# Patient Record
Sex: MALE | Race: WHITE | Employment: OTHER | URBAN - NONMETROPOLITAN AREA
[De-identification: names, ages, dates, MRNs, and addresses within clinical notes are randomized per-mention and may not be internally consistent; named-entity substitution may affect disease eponyms.]

---

## 2017-06-20 ENCOUNTER — HOSPITAL ENCOUNTER (EMERGENCY)
Facility: HOSPITAL | Age: 82
Discharge: HOME | End: 2017-06-20
Attending: PHYSICIAN ASSISTANT | Admitting: PHYSICIAN ASSISTANT
Payer: MEDICARE

## 2017-06-20 VITALS
OXYGEN SATURATION: 97 % | DIASTOLIC BLOOD PRESSURE: 101 MMHG | SYSTOLIC BLOOD PRESSURE: 183 MMHG | TEMPERATURE: 97.5 F | RESPIRATION RATE: 20 BRPM

## 2017-06-20 DIAGNOSIS — I10 ESSENTIAL HYPERTENSION: ICD-10-CM

## 2017-06-20 DIAGNOSIS — R42 DIZZINESS: ICD-10-CM

## 2017-06-20 LAB
ALBUMIN SERPL-MCNC: 3.7 G/DL (ref 3.4–5)
ALP SERPL-CCNC: 68 U/L (ref 40–150)
ALT SERPL W P-5'-P-CCNC: 28 U/L (ref 0–70)
ANION GAP SERPL CALCULATED.3IONS-SCNC: 7 MMOL/L (ref 3–14)
AST SERPL W P-5'-P-CCNC: 24 U/L (ref 0–45)
BASOPHILS # BLD AUTO: 0.1 10E9/L (ref 0–0.2)
BASOPHILS NFR BLD AUTO: 1.1 %
BILIRUB SERPL-MCNC: 0.8 MG/DL (ref 0.2–1.3)
BUN SERPL-MCNC: 22 MG/DL (ref 7–30)
CALCIUM SERPL-MCNC: 9.1 MG/DL (ref 8.5–10.1)
CHLORIDE SERPL-SCNC: 102 MMOL/L (ref 94–109)
CO2 SERPL-SCNC: 28 MMOL/L (ref 20–32)
CREAT SERPL-MCNC: 1.21 MG/DL (ref 0.66–1.25)
DIFFERENTIAL METHOD BLD: NORMAL
EOSINOPHIL # BLD AUTO: 0.2 10E9/L (ref 0–0.7)
EOSINOPHIL NFR BLD AUTO: 3.2 %
ERYTHROCYTE [DISTWIDTH] IN BLOOD BY AUTOMATED COUNT: 13.9 % (ref 10–15)
GFR SERPL CREATININE-BSD FRML MDRD: 56 ML/MIN/1.7M2
GLUCOSE SERPL-MCNC: 123 MG/DL (ref 70–99)
HCT VFR BLD AUTO: 41 % (ref 40–53)
HGB BLD-MCNC: 13.9 G/DL (ref 13.3–17.7)
IMM GRANULOCYTES # BLD: 0 10E9/L (ref 0–0.4)
IMM GRANULOCYTES NFR BLD: 0.2 %
LYMPHOCYTES # BLD AUTO: 1.2 10E9/L (ref 0.8–5.3)
LYMPHOCYTES NFR BLD AUTO: 21.5 %
MCH RBC QN AUTO: 30.8 PG (ref 26.5–33)
MCHC RBC AUTO-ENTMCNC: 33.9 G/DL (ref 31.5–36.5)
MCV RBC AUTO: 91 FL (ref 78–100)
MONOCYTES # BLD AUTO: 0.7 10E9/L (ref 0–1.3)
MONOCYTES NFR BLD AUTO: 12.2 %
NEUTROPHILS # BLD AUTO: 3.4 10E9/L (ref 1.6–8.3)
NEUTROPHILS NFR BLD AUTO: 61.8 %
NRBC # BLD AUTO: 0 10*3/UL
NRBC BLD AUTO-RTO: 0 /100
PLATELET # BLD AUTO: 180 10E9/L (ref 150–450)
POTASSIUM SERPL-SCNC: 4.4 MMOL/L (ref 3.4–5.3)
PROT SERPL-MCNC: 7.4 G/DL (ref 6.8–8.8)
RBC # BLD AUTO: 4.51 10E12/L (ref 4.4–5.9)
SODIUM SERPL-SCNC: 137 MMOL/L (ref 133–144)
WBC # BLD AUTO: 5.6 10E9/L (ref 4–11)

## 2017-06-20 PROCEDURE — 80053 COMPREHEN METABOLIC PANEL: CPT | Performed by: PHYSICIAN ASSISTANT

## 2017-06-20 PROCEDURE — 71010 ZZHC CHEST ONE VIEW: CPT | Mod: TC

## 2017-06-20 PROCEDURE — 93005 ELECTROCARDIOGRAM TRACING: CPT

## 2017-06-20 PROCEDURE — 93010 ELECTROCARDIOGRAM REPORT: CPT | Performed by: INTERNAL MEDICINE

## 2017-06-20 PROCEDURE — 70450 CT HEAD/BRAIN W/O DYE: CPT | Mod: TC

## 2017-06-20 PROCEDURE — 99285 EMERGENCY DEPT VISIT HI MDM: CPT | Performed by: PHYSICIAN ASSISTANT

## 2017-06-20 PROCEDURE — 36415 COLL VENOUS BLD VENIPUNCTURE: CPT | Performed by: PHYSICIAN ASSISTANT

## 2017-06-20 PROCEDURE — 99285 EMERGENCY DEPT VISIT HI MDM: CPT | Mod: 25

## 2017-06-20 PROCEDURE — 85025 COMPLETE CBC W/AUTO DIFF WBC: CPT | Performed by: PHYSICIAN ASSISTANT

## 2017-06-20 RX ORDER — TAMSULOSIN HYDROCHLORIDE 0.4 MG/1
0.4 CAPSULE ORAL DAILY
COMMUNITY
End: 2018-01-01

## 2017-06-20 ASSESSMENT — ENCOUNTER SYMPTOMS
WEAKNESS: 0
PSYCHIATRIC NEGATIVE: 1
SORE THROAT: 0
NECK PAIN: 0
NECK STIFFNESS: 0
FEVER: 0
TREMORS: 0
PHOTOPHOBIA: 0
LIGHT-HEADEDNESS: 0
SEIZURES: 0
DIZZINESS: 1
FACIAL ASYMMETRY: 0
SPEECH DIFFICULTY: 0
SHORTNESS OF BREATH: 0
HEADACHES: 0
NUMBNESS: 0
EYES NEGATIVE: 1
PALPITATIONS: 0
CHILLS: 0
CONSTITUTIONAL NEGATIVE: 1

## 2017-06-20 NOTE — ED AVS SNAPSHOT
[facility]    [address] [address]    Phone:  [phone]                                       Danny Muniz   MRN: [id number]    Department:  [facility]   Date of Visit:  6/20/2017           Patient Information     Date Of Birth          1/27/1928        Your diagnoses for this visit were:     Dizziness     Essential hypertension        You were seen by Paulina Glaser PA-C.      Follow-up Information     Follow up with Erick Estevez MD In 1 day.    Specialty:  Family Practice    Contact information:    [facility]  [address] [facility] [id number] [phone]          Follow up with [facility].    Specialty:  EMERGENCY MEDICINE    Why:  If symptoms worsen    Contact information:    [address] [address] [phone]    Additional information:    From [facility]: Take [address]. Turn left at [address]. Turn left at the first stoplight on [address]. At the first stop sign, take a right onto [address]. Take a left into the parking lot and continue through until you reach the North enterance of the building.       From [place]: Take [address]. Take the [address] ramp towards [place]. Turn left onto [address]. Take a slight right onto [address]. Turn left at the first stoplight on [address]. At the first stop sign, take a right onto [address]. Take a left into the parking lot and continue through until you reach the North enterance of the building.       From [place]: Take [address]. Take a right at [address]. At the first stop sign, take a right onto [address]. Take a left into the parking lot and continue through until you reach the North enterance of the building.         Discharge Instructions       Follow up with Dr. Estevez regarding your high blood pressure ASAP as you may need an additional medication  prescribed. Please return here sooner with any new or worsening symptoms.         Dizziness (Uncertain Cause)  Dizziness is a common symptom. It may be described as lightheadedness, spinning, or feeling like you are going to faint. Dizziness can have many causes.  Be sure to tell the healthcare provider about:    All medicines you take, including prescription, over-the-counter, herbs, and supplements    Any other symptoms you have    Any health problems you are being treated for    Anything that causes the dizziness to get worse or better  Today's exam did not show an exact cause for your dizziness. Other tests may be needed. Follow up with your healthcare provider.  Home care    Dizziness that occurs with sudden standing may be a sign of mild dehydration. Drink extra fluids for the next few days.    If you recently started a new medicine, stopped a medicine, or had the dose of a current medicine changed, talk with the prescribing healthcare provider. Your medicine plan may need adjustment.    If dizziness lasts more than a few seconds, sit or lie down until it passes. This may help prevent injury in case you pass out.    Do not drive or use power tools or dangerous equipment until you have had no dizziness for at least 48 hours.  Follow-up care  Follow up with your healthcare provider for further evaluation within the next 7 days or as advised.  When to seek medical advice  Call your healthcare provider for any of the following:    Worsening of symptoms or new symptoms    Passing out or seizure    Repeated vomiting    Headache    Palpitations (the sense that your heart is fluttering or beating fast or hard)    Shortness of breath    Blood in vomit or stool (black or red color)    Weakness of an arm or leg or one side of the face    Vision or hearing changes    Trouble walking or speaking    Chest, arm, neck, back, or jaw pain  Date Last Reviewed: 8/23/2015 2000-2017 [employer]. [address] [place] [address]  "Road, [place] [address]. All rights reserved. This information is not intended as a substitute for professional medical care. Always follow your healthcare professional's instructions.             Review of your medicines      Our records show that you are taking the medicines listed below. If these are incorrect, please call your family doctor or clinic.        Dose / Directions Last dose taken    ALLOPURINOL PO   Dose:  50 mg        Take 50 mg by mouth daily   Refills:  0        FLOMAX 0.4 MG capsule   Dose:  0.4 mg   Generic drug:  tamsulosin        Take 0.4 mg by mouth daily   Refills:  0        VITAMIN B 12 PO        Take by mouth daily   Refills:  0                Procedures and tests performed during your visit     CBC with platelets differential    CT Head w/o Contrast    Cardiac Continuous Monitoring    Chest  XR, 1 view portable    Comprehensive metabolic panel    EKG 12 lead    Orthostatic blood pressure and pulse    Peripheral IV catheter      Orders Needing Specimen Collection     None      Pending Results     Date and Time Order Name Status Description    6/20/2017 1544 Chest  XR, 1 view portable Preliminary             Pending Culture Results     No orders found from 6/18/2017 to 6/21/2017.            Thank you for choosing [facility]       Thank you for choosing [facility] for your care. Our goal is always to provide you with excellent care. Hearing back from our patients is one way we can continue to improve our services. Please take a few minutes to complete the written survey that you may receive in the mail after you visit with us. Thank you!        [employer] Information     [employer] lets you send messages to your doctor, view your test results, renew your prescriptions, schedule appointments and more. To sign up, go to [email or web address] . Click on \"Log in\" on the left side of the screen, which will take you to the Welcome page. Then click on \"Sign up Now\" on the right side of the page.     You " will be asked to enter the access code listed below, as well as some personal information. Please follow the directions to create your username and password.     Your access code is: [email or web address]  Expires: 2017  5:14 PM     Your access code will  in 90 days. If you need help or a new code, please call your [place] clinic or [phone].        Care EveryWhere ID     This is your Care EveryWhere ID. This could be used by other organizations to access your [facility] medical records  [id number]        After Visit Summary       This is your record. Keep this with you and show to your community pharmacist(s) and doctor(s) at your next visit.

## 2017-06-20 NOTE — ED AVS SNAPSHOT
[facility]    [address]    [place] [address]    Phone:  [phone]                                       Danny Muniz   MRN: [id number]    Department:  [facility]   Date of Visit:  6/20/2017           After Visit Summary Signature Page     I have received my discharge instructions, and my questions have been answered. I have discussed any challenges I see with this plan with the nurse or doctor.    ..........................................................................................................................................  Patient/Patient Representative Signature      ..........................................................................................................................................  Patient Representative Print Name and Relationship to Patient    ..................................................               ................................................  Date                                            Time    ..........................................................................................................................................  Reviewed by Signature/Title    ...................................................              ..............................................  Date                                                            Time

## 2017-06-20 NOTE — ED NOTES
Patient discharged home at this time. Patient given written and verbal discharge instructions regarding home care, f/u and medications. Patient verbalized understanding of all discharge instructions.

## 2017-06-20 NOTE — ED NOTES
89 y/o male presents with c/o sudden onset dizziness. States he was at mass at 1430 when he suddenly felt dizzy, worse with position changes. Denies chest pain, SOB, headache or n/v. Negative FAST

## 2017-06-20 NOTE — DISCHARGE INSTRUCTIONS
Follow up with Dr. Estevez regarding your high blood pressure ASAP as you may need an additional medication prescribed. Please return here sooner with any new or worsening symptoms.         Dizziness (Uncertain Cause)  Dizziness is a common symptom. It may be described as lightheadedness, spinning, or feeling like you are going to faint. Dizziness can have many causes.  Be sure to tell the healthcare provider about:    All medicines you take, including prescription, over-the-counter, herbs, and supplements    Any other symptoms you have    Any health problems you are being treated for    Anything that causes the dizziness to get worse or better  Today's exam did not show an exact cause for your dizziness. Other tests may be needed. Follow up with your healthcare provider.  Home care    Dizziness that occurs with sudden standing may be a sign of mild dehydration. Drink extra fluids for the next few days.    If you recently started a new medicine, stopped a medicine, or had the dose of a current medicine changed, talk with the prescribing healthcare provider. Your medicine plan may need adjustment.    If dizziness lasts more than a few seconds, sit or lie down until it passes. This may help prevent injury in case you pass out.    Do not drive or use power tools or dangerous equipment until you have had no dizziness for at least 48 hours.  Follow-up care  Follow up with your healthcare provider for further evaluation within the next 7 days or as advised.  When to seek medical advice  Call your healthcare provider for any of the following:    Worsening of symptoms or new symptoms    Passing out or seizure    Repeated vomiting    Headache    Palpitations (the sense that your heart is fluttering or beating fast or hard)    Shortness of breath    Blood in vomit or stool (black or red color)    Weakness of an arm or leg or one side of the face    Vision or hearing changes    Trouble walking or speaking    Chest, arm, neck,  back, or jaw pain  Date Last Reviewed: 8/23/2015 2000-2017 [employer]. [address] [place] [address], [place] [address]. All rights reserved. This information is not intended as a substitute for professional medical care. Always follow your healthcare professional's instructions.

## 2017-06-21 NOTE — ED PROVIDER NOTES
History     Chief Complaint   Patient presents with     Dizziness     worse with position changes     HPI  Danny Muniz is a 89 year old male who presents with dizziness while at [employer] this afternoon aroudn 1430. Dizziness was worse with position changes. Dizziness has resolved currently. He has h/o this and states it is usually due to mild dehydration. He tried drinking a few glasses of water at [employer] but the dizziness persisted, so he came here for evaluation. Denies HA, numbness, tingling, or weakness. Denies recent head injuries.     I have reviewed the Medications, Allergies, Past Medical and Surgical History, and Social History in the [employer] system.    Past Medical History: No past medical history on file.    No past surgical history on file.    Social History     Social History     Marital status: Single     Spouse name: N/A     Number of children: N/A     Years of education: N/A     Occupational History     Not on file.     Social History Main Topics     Smoking status: Not on file     Smokeless tobacco: Not on file     Alcohol use Not on file     Drug use: Not on file     Sexual activity: Not on file     Other Topics Concern     Not on file     Social History Narrative       Discharge Medication List as of 6/20/2017  5:18 PM      CONTINUE these medications which have NOT CHANGED    Details   ALLOPURINOL PO Take 50 mg by mouth daily, Historical      Cyanocobalamin (VITAMIN B 12 PO) Take by mouth daily, Historical      tamsulosin (FLOMAX) 0.4 MG capsule Take 0.4 mg by mouth daily, Historical             Allergies: Review of patient's allergies indicates no known allergies.    Review of Systems   Constitutional: Negative.  Negative for chills and fever.   HENT: Negative for sore throat.    Eyes: Negative.  Negative for photophobia and visual disturbance.   Respiratory: Negative for shortness of breath.    Cardiovascular: Negative for chest pain and palpitations.   Musculoskeletal: Negative for neck pain  and neck stiffness.   Skin: Negative for rash.   Neurological: Positive for dizziness. Negative for tremors, seizures, syncope, facial asymmetry, speech difficulty, weakness, light-headedness, numbness and headaches.   Psychiatric/Behavioral: Negative.    All other systems reviewed and are negative.      Physical Exam   BP: (!) 170/125  Heart Rate: 72  Temp: 97.1  F (36.2  C)  Resp: 20  SpO2: 96 %  Physical Exam   Constitutional: He is oriented to person, place, and time. He appears well-developed and well-nourished.  Non-toxic appearance. He does not have a sickly appearance. He does not appear ill. No distress.   HENT:   Head: Normocephalic and atraumatic.   Right Ear: Hearing, tympanic membrane, external ear and ear canal normal.   Left Ear: Hearing, tympanic membrane, external ear and ear canal normal.   Nose: Nose normal. Right sinus exhibits no maxillary sinus tenderness and no frontal sinus tenderness. Left sinus exhibits no maxillary sinus tenderness and no frontal sinus tenderness.   Mouth/Throat: Uvula is midline, oropharynx is clear and moist and mucous membranes are normal. No oropharyngeal exudate.   Eyes: Conjunctivae and EOM are normal. Pupils are equal, round, and reactive to light. Right eye exhibits no discharge. Left eye exhibits no discharge. No scleral icterus.   Fundoscopic exam:       The right eye shows no papilledema.        The left eye shows no papilledema.   Neck: Trachea normal, normal range of motion and full passive range of motion without pain. Neck supple. No JVD present. No Brudzinski's sign and no Kernig's sign noted.   Cardiovascular: Normal rate, regular rhythm, normal heart sounds and intact distal pulses.  Exam reveals no gallop and no friction rub.    No murmur heard.  Pulmonary/Chest: Effort normal and breath sounds normal. No respiratory distress. He has no wheezes. He has no rales.   Abdominal: Soft. Bowel sounds are normal.   Musculoskeletal: Normal range of motion. He  exhibits no edema.   Lymphadenopathy:     He has no cervical adenopathy.   Neurological: He is alert and oriented to person, place, and time. He has normal strength and normal reflexes. He displays no atrophy and no tremor. No cranial nerve deficit or sensory deficit. He exhibits normal muscle tone. He displays a negative Romberg sign. He displays no seizure activity. Coordination and gait normal. GCS eye subscore is 4. GCS verbal subscore is 5. GCS motor subscore is 6.   Skin: Skin is warm and dry. No rash noted. He is not diaphoretic.   Psychiatric: He has a normal mood and affect. His behavior is normal. Judgment and thought content normal.   Nursing note and vitals reviewed.      ED Course     ED Course     Procedures             Labs Ordered and Resulted from Time of ED Arrival Up to the Time of Departure from the ED   COMPREHENSIVE METABOLIC PANEL - Abnormal; Notable for the following:        Result Value    Glucose 123 (*)     GFR Estimate 56 (*)     All other components within normal limits   CBC WITH PLATELETS DIFFERENTIAL   PERIPHERAL IV CATHETER   ORTHOSTATIC BLOOD PRESSURE AND PULSE   CARDIAC CONTINUOUS MONITORING       Assessments & Plan (with Medical Decision Making)   Pt presents with dizziness worse with position changes. Orthostatics are negative. BP is actually elevated while here 160's-180's systolic/90's-100. Head CT negative for acute findings. Labs unremarkable. He does not appear dehydrated. He is on Flomax for BPH so perhaps this may have caused his symptoms earlier today, but he certainly isn't hypotensive while here. I will have him f/u with his PCP Dr. Estevez to address the hypertension and if additional medications are needed. Pt happy with this plan and he was discharged home in good condition. Walked around department w/o dizziness, feeling back to baseline.     Plan: Follow up with Dr. Estevez regarding your high blood pressure ASAP as you may need an additional medication  prescribed. Please return here sooner with any new or worsening symptoms.     I have reviewed the nursing notes.    I have reviewed the findings, diagnosis, plan and need for follow up with the patient.    Discharge Medication List as of 6/20/2017  5:18 PM          Final diagnoses:   Dizziness   Essential hypertension       6/20/2017   [facility]     Paulina Glaser PA-C  06/20/17 [id number]

## 2018-01-01 ENCOUNTER — APPOINTMENT (OUTPATIENT)
Dept: CT IMAGING | Facility: HOSPITAL | Age: 83
End: 2018-01-01
Attending: PHYSICIAN ASSISTANT
Payer: MEDICARE

## 2018-01-01 ENCOUNTER — HOSPITAL ENCOUNTER (INPATIENT)
Facility: HOSPITAL | Age: 83
LOS: 2 days | DRG: 682 | End: 2018-12-20
Attending: INTERNAL MEDICINE | Admitting: HOSPITALIST
Payer: MEDICARE

## 2018-01-01 ENCOUNTER — APPOINTMENT (OUTPATIENT)
Dept: SPEECH THERAPY | Facility: HOSPITAL | Age: 83
DRG: 682 | End: 2018-01-01
Attending: INTERNAL MEDICINE
Payer: MEDICARE

## 2018-01-01 ENCOUNTER — HOSPITAL ENCOUNTER (EMERGENCY)
Facility: HOSPITAL | Age: 83
Discharge: HOME | End: 2018-09-25
Attending: FAMILY MEDICINE | Admitting: FAMILY MEDICINE
Payer: MEDICARE

## 2018-01-01 ENCOUNTER — HOSPITAL ENCOUNTER (EMERGENCY)
Facility: HOSPITAL | Age: 83
Discharge: HOME | End: 2018-07-25
Attending: PHYSICIAN ASSISTANT | Admitting: PHYSICIAN ASSISTANT
Payer: MEDICARE

## 2018-01-01 ENCOUNTER — TRANSFERRED RECORDS (OUTPATIENT)
Dept: HEALTH INFORMATION MANAGEMENT | Facility: CLINIC | Age: 83
End: 2018-01-01

## 2018-01-01 ENCOUNTER — HOSPITAL ENCOUNTER (OUTPATIENT)
Dept: MRI IMAGING | Facility: HOSPITAL | Age: 83
Discharge: HOME | End: 2018-06-01
Attending: FAMILY MEDICINE | Admitting: FAMILY MEDICINE
Payer: MEDICARE

## 2018-01-01 ENCOUNTER — APPOINTMENT (OUTPATIENT)
Dept: CT IMAGING | Facility: HOSPITAL | Age: 83
End: 2018-01-01
Attending: FAMILY MEDICINE
Payer: MEDICARE

## 2018-01-01 ENCOUNTER — APPOINTMENT (OUTPATIENT)
Dept: GENERAL RADIOLOGY | Facility: HOSPITAL | Age: 83
End: 2018-01-01
Attending: EMERGENCY MEDICINE
Payer: MEDICARE

## 2018-01-01 ENCOUNTER — APPOINTMENT (OUTPATIENT)
Dept: GENERAL RADIOLOGY | Facility: HOSPITAL | Age: 83
DRG: 682 | End: 2018-01-01
Attending: INTERNAL MEDICINE
Payer: MEDICARE

## 2018-01-01 ENCOUNTER — APPOINTMENT (OUTPATIENT)
Dept: GENERAL RADIOLOGY | Facility: HOSPITAL | Age: 83
End: 2018-01-01
Attending: INTERNAL MEDICINE
Payer: MEDICARE

## 2018-01-01 ENCOUNTER — HOSPITAL ENCOUNTER (OUTPATIENT)
Dept: MRI IMAGING | Facility: HOSPITAL | Age: 83
End: 2018-06-12
Attending: FAMILY MEDICINE
Payer: MEDICARE

## 2018-01-01 ENCOUNTER — HOSPITAL ENCOUNTER (EMERGENCY)
Facility: HOSPITAL | Age: 83
Discharge: HOME | End: 2018-06-13
Attending: INTERNAL MEDICINE | Admitting: INTERNAL MEDICINE
Payer: MEDICARE

## 2018-01-01 ENCOUNTER — HOSPITAL ENCOUNTER (EMERGENCY)
Facility: HOSPITAL | Age: 83
Discharge: HOME | End: 2018-09-15
Attending: FAMILY MEDICINE | Admitting: FAMILY MEDICINE
Payer: MEDICARE

## 2018-01-01 ENCOUNTER — APPOINTMENT (OUTPATIENT)
Dept: GENERAL RADIOLOGY | Facility: HOSPITAL | Age: 83
End: 2018-01-01
Attending: FAMILY MEDICINE
Payer: MEDICARE

## 2018-01-01 ENCOUNTER — HOSPITAL ENCOUNTER (OUTPATIENT)
Dept: CARDIOLOGY | Facility: HOSPITAL | Age: 83
End: 2018-06-12
Attending: FAMILY MEDICINE
Payer: MEDICARE

## 2018-01-01 ENCOUNTER — HOSPITAL ENCOUNTER (EMERGENCY)
Facility: HOSPITAL | Age: 83
Discharge: HOME | End: 2018-09-18
Attending: EMERGENCY MEDICINE | Admitting: EMERGENCY MEDICINE
Payer: MEDICARE

## 2018-01-01 ENCOUNTER — APPOINTMENT (OUTPATIENT)
Dept: CT IMAGING | Facility: HOSPITAL | Age: 83
End: 2018-01-01
Attending: INTERNAL MEDICINE
Payer: MEDICARE

## 2018-01-01 ENCOUNTER — HOSPITAL ENCOUNTER (EMERGENCY)
Facility: HOSPITAL | Age: 83
Discharge: HOME | End: 2018-10-30
Attending: FAMILY MEDICINE | Admitting: FAMILY MEDICINE
Payer: MEDICARE

## 2018-01-01 ENCOUNTER — HOSPITAL ENCOUNTER (OUTPATIENT)
Dept: MRI IMAGING | Facility: HOSPITAL | Age: 83
Discharge: HOME | End: 2018-06-12
Attending: FAMILY MEDICINE | Admitting: FAMILY MEDICINE
Payer: MEDICARE

## 2018-01-01 ENCOUNTER — TELEPHONE (OUTPATIENT)
Dept: CASE MANAGEMENT | Facility: HOSPITAL | Age: 83
End: 2018-01-01

## 2018-01-01 VITALS
DIASTOLIC BLOOD PRESSURE: 81 MMHG | SYSTOLIC BLOOD PRESSURE: 144 MMHG | TEMPERATURE: 96.9 F | OXYGEN SATURATION: 95 % | RESPIRATION RATE: 17 BRPM | HEART RATE: 68 BPM

## 2018-01-01 VITALS
RESPIRATION RATE: 16 BRPM | DIASTOLIC BLOOD PRESSURE: 72 MMHG | SYSTOLIC BLOOD PRESSURE: 161 MMHG | TEMPERATURE: 98.1 F | HEART RATE: 83 BPM | OXYGEN SATURATION: 98 %

## 2018-01-01 VITALS
HEART RATE: 85 BPM | BODY MASS INDEX: 19.95 KG/M2 | OXYGEN SATURATION: 90 % | DIASTOLIC BLOOD PRESSURE: 68 MMHG | RESPIRATION RATE: 26 BRPM | WEIGHT: 116.84 LBS | TEMPERATURE: 98.4 F | SYSTOLIC BLOOD PRESSURE: 109 MMHG | HEIGHT: 64 IN

## 2018-01-01 VITALS
DIASTOLIC BLOOD PRESSURE: 101 MMHG | HEART RATE: 88 BPM | OXYGEN SATURATION: 97 % | TEMPERATURE: 98.1 F | RESPIRATION RATE: 18 BRPM | SYSTOLIC BLOOD PRESSURE: 178 MMHG

## 2018-01-01 VITALS
TEMPERATURE: 97.8 F | RESPIRATION RATE: 18 BRPM | OXYGEN SATURATION: 98 % | DIASTOLIC BLOOD PRESSURE: 79 MMHG | SYSTOLIC BLOOD PRESSURE: 174 MMHG

## 2018-01-01 VITALS
OXYGEN SATURATION: 98 % | DIASTOLIC BLOOD PRESSURE: 78 MMHG | HEART RATE: 60 BPM | SYSTOLIC BLOOD PRESSURE: 155 MMHG | TEMPERATURE: 97.2 F | RESPIRATION RATE: 17 BRPM

## 2018-01-01 VITALS
OXYGEN SATURATION: 98 % | SYSTOLIC BLOOD PRESSURE: 120 MMHG | TEMPERATURE: 97.6 F | RESPIRATION RATE: 19 BRPM | DIASTOLIC BLOOD PRESSURE: 71 MMHG

## 2018-01-01 DIAGNOSIS — R29.6 FALLS FREQUENTLY: ICD-10-CM

## 2018-01-01 DIAGNOSIS — I63.9 CEREBELLAR STROKE (H): ICD-10-CM

## 2018-01-01 DIAGNOSIS — N39.0 UTI (URINARY TRACT INFECTION) WITH PYURIA: ICD-10-CM

## 2018-01-01 DIAGNOSIS — R39.81 FUNCTIONAL URINARY INCONTINENCE: ICD-10-CM

## 2018-01-01 DIAGNOSIS — N39.43 POST-VOID DRIBBLING: ICD-10-CM

## 2018-01-01 DIAGNOSIS — N18.9 CHRONIC KIDNEY DISEASE, UNSPECIFIED CKD STAGE: ICD-10-CM

## 2018-01-01 DIAGNOSIS — N32.89 SPASTIC BLADDER: ICD-10-CM

## 2018-01-01 DIAGNOSIS — R29.898 WEAKNESS OF BOTH LOWER EXTREMITIES: ICD-10-CM

## 2018-01-01 DIAGNOSIS — I10 HYPERTENSION, UNCONTROLLED: ICD-10-CM

## 2018-01-01 DIAGNOSIS — B02.9 HERPES ZOSTER WITHOUT COMPLICATION: ICD-10-CM

## 2018-01-01 LAB
ALBUMIN SERPL-MCNC: 2.6 G/DL (ref 3.4–5)
ALBUMIN SERPL-MCNC: 3.4 G/DL (ref 3.4–5)
ALBUMIN SERPL-MCNC: 3.4 G/DL (ref 3.4–5)
ALBUMIN SERPL-MCNC: 3.5 G/DL (ref 3.4–5)
ALBUMIN SERPL-MCNC: 3.6 G/DL (ref 3.4–5)
ALBUMIN SERPL-MCNC: 4 G/DL (ref 3.4–5)
ALBUMIN UR-MCNC: 10 MG/DL
ALBUMIN UR-MCNC: 100 MG/DL
ALBUMIN UR-MCNC: 30 MG/DL
ALBUMIN UR-MCNC: 30 MG/DL
ALBUMIN UR-MCNC: NEGATIVE MG/DL
ALP SERPL-CCNC: 64 U/L (ref 40–150)
ALP SERPL-CCNC: 81 U/L (ref 40–150)
ALP SERPL-CCNC: 84 U/L (ref 40–150)
ALP SERPL-CCNC: 86 U/L (ref 40–150)
ALP SERPL-CCNC: 98 U/L (ref 40–150)
ALP SERPL-CCNC: 98 U/L (ref 40–150)
ALT SERPL W P-5'-P-CCNC: 18 U/L (ref 0–70)
ALT SERPL W P-5'-P-CCNC: 20 U/L (ref 0–70)
ALT SERPL W P-5'-P-CCNC: 22 U/L (ref 0–70)
ALT SERPL W P-5'-P-CCNC: 251 U/L (ref 0–70)
ALT SERPL W P-5'-P-CCNC: 49 U/L (ref 0–70)
ALT SERPL W P-5'-P-CCNC: 53 U/L (ref 0–70)
ANION GAP SERPL CALCULATED.3IONS-SCNC: 11 MMOL/L (ref 3–14)
ANION GAP SERPL CALCULATED.3IONS-SCNC: 13 MMOL/L (ref 3–14)
ANION GAP SERPL CALCULATED.3IONS-SCNC: 14 MMOL/L (ref 3–14)
ANION GAP SERPL CALCULATED.3IONS-SCNC: 6 MMOL/L (ref 3–14)
ANION GAP SERPL CALCULATED.3IONS-SCNC: 6 MMOL/L (ref 3–14)
ANION GAP SERPL CALCULATED.3IONS-SCNC: 7 MMOL/L (ref 3–14)
ANION GAP SERPL CALCULATED.3IONS-SCNC: 8 MMOL/L (ref 3–14)
ANION GAP SERPL CALCULATED.3IONS-SCNC: 8 MMOL/L (ref 3–14)
APPEARANCE UR: ABNORMAL
APPEARANCE UR: CLEAR
AST SERPL W P-5'-P-CCNC: 154 U/L (ref 0–45)
AST SERPL W P-5'-P-CCNC: 17 U/L (ref 0–45)
AST SERPL W P-5'-P-CCNC: 18 U/L (ref 0–45)
AST SERPL W P-5'-P-CCNC: 23 U/L (ref 0–45)
AST SERPL W P-5'-P-CCNC: 429 U/L (ref 0–45)
AST SERPL W P-5'-P-CCNC: 55 U/L (ref 0–45)
BACTERIA #/AREA URNS HPF: ABNORMAL /HPF
BACTERIA SPEC CULT: ABNORMAL
BACTERIA SPEC CULT: NORMAL
BASOPHILS # BLD AUTO: 0 10E9/L (ref 0–0.2)
BASOPHILS NFR BLD AUTO: 0.1 %
BASOPHILS NFR BLD AUTO: 0.3 %
BASOPHILS NFR BLD AUTO: 0.5 %
BASOPHILS NFR BLD AUTO: 0.5 %
BASOPHILS NFR BLD AUTO: 0.6 %
BASOPHILS NFR BLD AUTO: 0.6 %
BASOPHILS NFR BLD AUTO: 0.7 %
BILIRUB SERPL-MCNC: 0.7 MG/DL (ref 0.2–1.3)
BILIRUB SERPL-MCNC: 0.7 MG/DL (ref 0.2–1.3)
BILIRUB SERPL-MCNC: 0.8 MG/DL (ref 0.2–1.3)
BILIRUB SERPL-MCNC: 1.1 MG/DL (ref 0.2–1.3)
BILIRUB SERPL-MCNC: 1.3 MG/DL (ref 0.2–1.3)
BILIRUB SERPL-MCNC: 1.6 MG/DL (ref 0.2–1.3)
BILIRUB UR QL STRIP: NEGATIVE
BUN SERPL-MCNC: 125 MG/DL (ref 7–30)
BUN SERPL-MCNC: 128 MG/DL (ref 7–30)
BUN SERPL-MCNC: 21 MG/DL (ref 7–30)
BUN SERPL-MCNC: 28 MG/DL (ref 7–30)
BUN SERPL-MCNC: 30 MG/DL (ref 7–30)
BUN SERPL-MCNC: 32 MG/DL (ref 7–30)
BUN SERPL-MCNC: 39 MG/DL (ref 7–30)
BUN SERPL-MCNC: 47 MG/DL (ref 7–30)
CALCIUM SERPL-MCNC: 8.2 MG/DL (ref 8.5–10.1)
CALCIUM SERPL-MCNC: 8.3 MG/DL (ref 8.5–10.1)
CALCIUM SERPL-MCNC: 8.4 MG/DL (ref 8.5–10.1)
CALCIUM SERPL-MCNC: 8.6 MG/DL (ref 8.5–10.1)
CALCIUM SERPL-MCNC: 8.7 MG/DL (ref 8.5–10.1)
CALCIUM SERPL-MCNC: 9.2 MG/DL (ref 8.5–10.1)
CALCIUM SERPL-MCNC: 9.3 MG/DL (ref 8.5–10.1)
CALCIUM SERPL-MCNC: 9.3 MG/DL (ref 8.5–10.1)
CHLORIDE SERPL-SCNC: 101 MMOL/L (ref 94–109)
CHLORIDE SERPL-SCNC: 103 MMOL/L (ref 94–109)
CHLORIDE SERPL-SCNC: 103 MMOL/L (ref 94–109)
CHLORIDE SERPL-SCNC: 104 MMOL/L (ref 94–109)
CHLORIDE SERPL-SCNC: 116 MMOL/L (ref 94–109)
CHLORIDE SERPL-SCNC: 119 MMOL/L (ref 94–109)
CHLORIDE SERPL-SCNC: 94 MMOL/L (ref 94–109)
CHLORIDE SERPL-SCNC: 94 MMOL/L (ref 94–109)
CK SERPL-CCNC: 96 U/L (ref 30–300)
CO2 SERPL-SCNC: 18 MMOL/L (ref 20–32)
CO2 SERPL-SCNC: 26 MMOL/L (ref 20–32)
CO2 SERPL-SCNC: 27 MMOL/L (ref 20–32)
CO2 SERPL-SCNC: 28 MMOL/L (ref 20–32)
CO2 SERPL-SCNC: 28 MMOL/L (ref 20–32)
CO2 SERPL-SCNC: 29 MMOL/L (ref 20–32)
COLOR UR AUTO: ABNORMAL
COLOR UR AUTO: YELLOW
CREAT SERPL-MCNC: 0.91 MG/DL (ref 0.66–1.25)
CREAT SERPL-MCNC: 1 MG/DL (ref 0.66–1.25)
CREAT SERPL-MCNC: 1.06 MG/DL (ref 0.66–1.25)
CREAT SERPL-MCNC: 1.06 MG/DL (ref 0.66–1.25)
CREAT SERPL-MCNC: 1.2 MG/DL (ref 0.66–1.25)
CREAT SERPL-MCNC: 1.41 MG/DL (ref 0.7–1.2)
CREAT SERPL-MCNC: 1.61 MG/DL (ref 0.66–1.25)
CREAT SERPL-MCNC: 1.99 MG/DL (ref 0.66–1.25)
CREAT SERPL-MCNC: 2.11 MG/DL (ref 0.66–1.25)
CREAT SERPL-MCNC: 2.19 MG/DL (ref 0.66–1.25)
CRP SERPL-MCNC: 11.3 MG/L (ref 0–8)
CRP SERPL-MCNC: 6.5 MG/L (ref 0–8)
DIFFERENTIAL METHOD BLD: ABNORMAL
EOSINOPHIL # BLD AUTO: 0 10E9/L (ref 0–0.7)
EOSINOPHIL # BLD AUTO: 0.1 10E9/L (ref 0–0.7)
EOSINOPHIL # BLD AUTO: 0.1 10E9/L (ref 0–0.7)
EOSINOPHIL # BLD AUTO: 0.2 10E9/L (ref 0–0.7)
EOSINOPHIL NFR BLD AUTO: 0.1 %
EOSINOPHIL NFR BLD AUTO: 0.2 %
EOSINOPHIL NFR BLD AUTO: 1.9 %
EOSINOPHIL NFR BLD AUTO: 1.9 %
EOSINOPHIL NFR BLD AUTO: 2.8 %
ERYTHROCYTE [DISTWIDTH] IN BLOOD BY AUTOMATED COUNT: 13.2 % (ref 10–15)
ERYTHROCYTE [DISTWIDTH] IN BLOOD BY AUTOMATED COUNT: 13.3 % (ref 10–15)
ERYTHROCYTE [DISTWIDTH] IN BLOOD BY AUTOMATED COUNT: 13.4 % (ref 10–15)
ERYTHROCYTE [DISTWIDTH] IN BLOOD BY AUTOMATED COUNT: 13.5 % (ref 10–15)
ERYTHROCYTE [DISTWIDTH] IN BLOOD BY AUTOMATED COUNT: 13.6 % (ref 10–15)
ERYTHROCYTE [DISTWIDTH] IN BLOOD BY AUTOMATED COUNT: 13.7 % (ref 10–15)
ERYTHROCYTE [DISTWIDTH] IN BLOOD BY AUTOMATED COUNT: 15 % (ref 10–15)
ERYTHROCYTE [DISTWIDTH] IN BLOOD BY AUTOMATED COUNT: 15.1 % (ref 10–15)
ERYTHROCYTE [SEDIMENTATION RATE] IN BLOOD BY WESTERGREN METHOD: 17 MM/H (ref 0–20)
GFR SERPL CREATININE-BSD FRML MDRD: 25 ML/MIN/{1.73_M2}
GFR SERPL CREATININE-BSD FRML MDRD: 27 ML/MIN/{1.73_M2}
GFR SERPL CREATININE-BSD FRML MDRD: 32 ML/MIN/1.7M2
GFR SERPL CREATININE-BSD FRML MDRD: 40 ML/MIN/1.7M2
GFR SERPL CREATININE-BSD FRML MDRD: 57 ML/MIN/1.7M2
GFR SERPL CREATININE-BSD FRML MDRD: 66 ML/MIN/1.7M2
GFR SERPL CREATININE-BSD FRML MDRD: 66 ML/MIN/1.7M2
GFR SERPL CREATININE-BSD FRML MDRD: 70 ML/MIN/1.7M2
GFR SERPL CREATININE-BSD FRML MDRD: 78 ML/MIN/1.7M2
GLUCOSE SERPL-MCNC: 105 MG/DL (ref 70–99)
GLUCOSE SERPL-MCNC: 105 MG/DL (ref 70–99)
GLUCOSE SERPL-MCNC: 119 MG/DL (ref 70–99)
GLUCOSE SERPL-MCNC: 122 MG/DL (ref 70–99)
GLUCOSE SERPL-MCNC: 126 MG/DL (ref 70–99)
GLUCOSE SERPL-MCNC: 162 MG/DL (ref 70–99)
GLUCOSE SERPL-MCNC: 86 MG/DL (ref 70–100)
GLUCOSE SERPL-MCNC: 87 MG/DL (ref 70–99)
GLUCOSE SERPL-MCNC: 91 MG/DL (ref 70–99)
GLUCOSE UR STRIP-MCNC: NEGATIVE MG/DL
HCT VFR BLD AUTO: 32.4 % (ref 40–53)
HCT VFR BLD AUTO: 32.6 % (ref 40–53)
HCT VFR BLD AUTO: 36.6 % (ref 40–53)
HCT VFR BLD AUTO: 37.8 % (ref 40–53)
HCT VFR BLD AUTO: 38.7 % (ref 40–53)
HCT VFR BLD AUTO: 39.9 % (ref 40–53)
HCT VFR BLD AUTO: 40.1 % (ref 40–53)
HCT VFR BLD AUTO: 45.1 % (ref 40–53)
HGB BLD-MCNC: 11.2 G/DL (ref 13.3–17.7)
HGB BLD-MCNC: 11.3 G/DL (ref 13.3–17.7)
HGB BLD-MCNC: 13 G/DL (ref 13.3–17.7)
HGB BLD-MCNC: 13 G/DL (ref 13.3–17.7)
HGB BLD-MCNC: 13.2 G/DL (ref 13.3–17.7)
HGB BLD-MCNC: 13.3 G/DL (ref 13.3–17.7)
HGB BLD-MCNC: 13.9 G/DL (ref 13.3–17.7)
HGB BLD-MCNC: 15.1 G/DL (ref 13.3–17.7)
HGB UR QL STRIP: ABNORMAL
HGB UR QL STRIP: NEGATIVE
HGB UR QL STRIP: NEGATIVE
IMM GRANULOCYTES # BLD: 0 10E9/L (ref 0–0.4)
IMM GRANULOCYTES NFR BLD: 0.2 %
IMM GRANULOCYTES NFR BLD: 0.2 %
IMM GRANULOCYTES NFR BLD: 0.3 %
IMM GRANULOCYTES NFR BLD: 0.4 %
IMM GRANULOCYTES NFR BLD: 0.4 %
INR PPP: 1.08 (ref 0.8–1.2)
KETONES UR STRIP-MCNC: 10 MG/DL
KETONES UR STRIP-MCNC: NEGATIVE MG/DL
LACTATE BLD-SCNC: 1.2 MMOL/L (ref 0.7–2)
LACTATE BLD-SCNC: 2.7 MMOL/L (ref 0.7–2)
LEUKOCYTE ESTERASE UR QL STRIP: ABNORMAL
LEUKOCYTE ESTERASE UR QL STRIP: NEGATIVE
LYMPHOCYTES # BLD AUTO: 0.5 10E9/L (ref 0.8–5.3)
LYMPHOCYTES # BLD AUTO: 0.5 10E9/L (ref 0.8–5.3)
LYMPHOCYTES # BLD AUTO: 0.7 10E9/L (ref 0.8–5.3)
LYMPHOCYTES # BLD AUTO: 0.7 10E9/L (ref 0.8–5.3)
LYMPHOCYTES # BLD AUTO: 0.9 10E9/L (ref 0.8–5.3)
LYMPHOCYTES # BLD AUTO: 1 10E9/L (ref 0.8–5.3)
LYMPHOCYTES # BLD AUTO: 1.4 10E9/L (ref 0.8–5.3)
LYMPHOCYTES NFR BLD AUTO: 10.3 %
LYMPHOCYTES NFR BLD AUTO: 12.6 %
LYMPHOCYTES NFR BLD AUTO: 17.6 %
LYMPHOCYTES NFR BLD AUTO: 26.2 %
LYMPHOCYTES NFR BLD AUTO: 4.6 %
LYMPHOCYTES NFR BLD AUTO: 6.3 %
LYMPHOCYTES NFR BLD AUTO: 8.7 %
Lab: NORMAL
Lab: NORMAL
MAGNESIUM SERPL-MCNC: 2.1 MG/DL (ref 1.6–2.3)
MCH RBC QN AUTO: 30.5 PG (ref 26.5–33)
MCH RBC QN AUTO: 31 PG (ref 26.5–33)
MCH RBC QN AUTO: 31.2 PG (ref 26.5–33)
MCH RBC QN AUTO: 31.2 PG (ref 26.5–33)
MCH RBC QN AUTO: 31.3 PG (ref 26.5–33)
MCH RBC QN AUTO: 31.4 PG (ref 26.5–33)
MCH RBC QN AUTO: 31.4 PG (ref 26.5–33)
MCH RBC QN AUTO: 31.5 PG (ref 26.5–33)
MCHC RBC AUTO-ENTMCNC: 32.9 G/DL (ref 31.5–36.5)
MCHC RBC AUTO-ENTMCNC: 33.5 G/DL (ref 31.5–36.5)
MCHC RBC AUTO-ENTMCNC: 34.4 G/DL (ref 31.5–36.5)
MCHC RBC AUTO-ENTMCNC: 34.4 G/DL (ref 31.5–36.5)
MCHC RBC AUTO-ENTMCNC: 34.6 G/DL (ref 31.5–36.5)
MCHC RBC AUTO-ENTMCNC: 34.7 G/DL (ref 31.5–36.5)
MCHC RBC AUTO-ENTMCNC: 34.8 G/DL (ref 31.5–36.5)
MCHC RBC AUTO-ENTMCNC: 35.5 G/DL (ref 31.5–36.5)
MCV RBC AUTO: 88 FL (ref 78–100)
MCV RBC AUTO: 89 FL (ref 78–100)
MCV RBC AUTO: 90 FL (ref 78–100)
MCV RBC AUTO: 90 FL (ref 78–100)
MCV RBC AUTO: 91 FL (ref 78–100)
MCV RBC AUTO: 91 FL (ref 78–100)
MCV RBC AUTO: 94 FL (ref 78–100)
MCV RBC AUTO: 95 FL (ref 78–100)
MONOCYTES # BLD AUTO: 0.5 10E9/L (ref 0–1.3)
MONOCYTES # BLD AUTO: 0.7 10E9/L (ref 0–1.3)
MONOCYTES # BLD AUTO: 0.7 10E9/L (ref 0–1.3)
MONOCYTES # BLD AUTO: 0.8 10E9/L (ref 0–1.3)
MONOCYTES # BLD AUTO: 1 10E9/L (ref 0–1.3)
MONOCYTES NFR BLD AUTO: 12.3 %
MONOCYTES NFR BLD AUTO: 12.8 %
MONOCYTES NFR BLD AUTO: 14.9 %
MONOCYTES NFR BLD AUTO: 17.9 %
MONOCYTES NFR BLD AUTO: 6 %
MONOCYTES NFR BLD AUTO: 7.7 %
MONOCYTES NFR BLD AUTO: 9.1 %
MUCOUS THREADS #/AREA URNS LPF: PRESENT /LPF
NEUTROPHILS # BLD AUTO: 14 10E9/L (ref 1.6–8.3)
NEUTROPHILS # BLD AUTO: 3.1 10E9/L (ref 1.6–8.3)
NEUTROPHILS # BLD AUTO: 3.3 10E9/L (ref 1.6–8.3)
NEUTROPHILS # BLD AUTO: 3.5 10E9/L (ref 1.6–8.3)
NEUTROPHILS # BLD AUTO: 4.9 10E9/L (ref 1.6–8.3)
NEUTROPHILS # BLD AUTO: 5 10E9/L (ref 1.6–8.3)
NEUTROPHILS # BLD AUTO: 9.5 10E9/L (ref 1.6–8.3)
NEUTROPHILS NFR BLD AUTO: 57.3 %
NEUTROPHILS NFR BLD AUTO: 63.6 %
NEUTROPHILS NFR BLD AUTO: 71.9 %
NEUTROPHILS NFR BLD AUTO: 72.2 %
NEUTROPHILS NFR BLD AUTO: 82.6 %
NEUTROPHILS NFR BLD AUTO: 83.8 %
NEUTROPHILS NFR BLD AUTO: 88.9 %
NITRATE UR QL: NEGATIVE
NITRATE UR QL: POSITIVE
NRBC # BLD AUTO: 0 10*3/UL
NRBC BLD AUTO-RTO: 0 /100
NT-PROBNP SERPL-MCNC: 1270 PG/ML (ref 0–1800)
PH UR STRIP: 5.5 PH (ref 4.7–8)
PH UR STRIP: 5.5 PH (ref 4.7–8)
PH UR STRIP: 6 PH (ref 4.7–8)
PH UR STRIP: 6.5 PH (ref 4.7–8)
PH UR STRIP: 8 PH (ref 4.7–8)
PLATELET # BLD AUTO: 160 10E9/L (ref 150–450)
PLATELET # BLD AUTO: 164 10E9/L (ref 150–450)
PLATELET # BLD AUTO: 167 10E9/L (ref 150–450)
PLATELET # BLD AUTO: 169 10E9/L (ref 150–450)
PLATELET # BLD AUTO: 178 10E9/L (ref 150–450)
PLATELET # BLD AUTO: 208 10E9/L (ref 150–450)
PLATELET # BLD AUTO: 239 10E9/L (ref 150–450)
PLATELET # BLD AUTO: 270 10E9/L (ref 150–450)
PLATELET # BLD AUTO: 306 10E9/L (ref 150–450)
POTASSIUM SERPL-SCNC: 3.5 MMOL/L (ref 3.4–5.3)
POTASSIUM SERPL-SCNC: 3.7 MMOL/L (ref 3.4–5.3)
POTASSIUM SERPL-SCNC: 3.8 MMOL/L (ref 3.4–5.3)
POTASSIUM SERPL-SCNC: 4.1 MMOL/L (ref 3.4–5.3)
POTASSIUM SERPL-SCNC: 4.2 MMOL/L (ref 3.4–5.3)
POTASSIUM SERPL-SCNC: 4.3 MEQ/L (ref 3.4–5.1)
POTASSIUM SERPL-SCNC: 4.3 MMOL/L (ref 3.4–5.3)
POTASSIUM SERPL-SCNC: 4.4 MMOL/L (ref 3.4–5.3)
POTASSIUM SERPL-SCNC: 4.6 MMOL/L (ref 3.4–5.3)
PROT SERPL-MCNC: 6.6 G/DL (ref 6.8–8.8)
PROT SERPL-MCNC: 6.8 G/DL (ref 6.8–8.8)
PROT SERPL-MCNC: 7 G/DL (ref 6.8–8.8)
PROT SERPL-MCNC: 7.1 G/DL (ref 6.8–8.8)
PROT SERPL-MCNC: 7.3 G/DL (ref 6.8–8.8)
PROT SERPL-MCNC: 7.6 G/DL (ref 6.8–8.8)
RBC # BLD AUTO: 3.59 10E12/L (ref 4.4–5.9)
RBC # BLD AUTO: 3.64 10E12/L (ref 4.4–5.9)
RBC # BLD AUTO: 4.16 10E12/L (ref 4.4–5.9)
RBC # BLD AUTO: 4.21 10E12/L (ref 4.4–5.9)
RBC # BLD AUTO: 4.26 10E12/L (ref 4.4–5.9)
RBC # BLD AUTO: 4.26 10E12/L (ref 4.4–5.9)
RBC # BLD AUTO: 4.41 10E12/L (ref 4.4–5.9)
RBC # BLD AUTO: 4.81 10E12/L (ref 4.4–5.9)
RBC #/AREA URNS AUTO: 0 /HPF (ref 0–2)
RBC #/AREA URNS AUTO: 1 /HPF (ref 0–2)
RBC #/AREA URNS AUTO: 2 /HPF (ref 0–2)
RBC #/AREA URNS AUTO: 5 /HPF (ref 0–2)
RBC #/AREA URNS AUTO: <1 /HPF (ref 0–2)
SODIUM SERPL-SCNC: 127 MMOL/L (ref 133–144)
SODIUM SERPL-SCNC: 130 MMOL/L (ref 133–144)
SODIUM SERPL-SCNC: 136 MMOL/L (ref 133–144)
SODIUM SERPL-SCNC: 137 MMOL/L (ref 133–144)
SODIUM SERPL-SCNC: 139 MMOL/L (ref 133–144)
SODIUM SERPL-SCNC: 140 MMOL/L (ref 133–144)
SODIUM SERPL-SCNC: 151 MMOL/L (ref 133–144)
SODIUM SERPL-SCNC: 155 MMOL/L (ref 133–144)
SOURCE: ABNORMAL
SP GR UR STRIP: 1 (ref 1–1.03)
SP GR UR STRIP: 1.01 (ref 1–1.03)
SPECIMEN SOURCE: ABNORMAL
SPECIMEN SOURCE: NORMAL
TROPONIN I SERPL-MCNC: 0.01 UG/L (ref 0–0.04)
UROBILINOGEN UR STRIP-MCNC: 2 MG/DL (ref 0–2)
UROBILINOGEN UR STRIP-MCNC: NORMAL MG/DL (ref 0–2)
WBC # BLD AUTO: 11.4 10E9/L (ref 4–11)
WBC # BLD AUTO: 15.7 10E9/L (ref 4–11)
WBC # BLD AUTO: 22.4 10E9/L (ref 4–11)
WBC # BLD AUTO: 4.6 10E9/L (ref 4–11)
WBC # BLD AUTO: 5.5 10E9/L (ref 4–11)
WBC # BLD AUTO: 5.5 10E9/L (ref 4–11)
WBC # BLD AUTO: 6 10E9/L (ref 4–11)
WBC # BLD AUTO: 6.8 10E9/L (ref 4–11)
WBC #/AREA URNS AUTO: 0 /HPF (ref 0–5)
WBC #/AREA URNS AUTO: 1 /HPF (ref 0–5)
WBC #/AREA URNS AUTO: 1 /HPF (ref 0–5)
WBC #/AREA URNS AUTO: 32 /HPF (ref 0–5)
WBC #/AREA URNS AUTO: <1 /HPF (ref 0–5)

## 2018-01-01 PROCEDURE — 25000125 ZZHC RX 250

## 2018-01-01 PROCEDURE — 25000132 ZZH RX MED GY IP 250 OP 250 PS 637: Mod: GY | Performed by: INTERNAL MEDICINE

## 2018-01-01 PROCEDURE — A9585 GADOBUTROL INJECTION: HCPCS | Performed by: RADIOLOGY

## 2018-01-01 PROCEDURE — 81001 URINALYSIS AUTO W/SCOPE: CPT | Performed by: FAMILY MEDICINE

## 2018-01-01 PROCEDURE — 25000128 H RX IP 250 OP 636: Performed by: PHYSICIAN ASSISTANT

## 2018-01-01 PROCEDURE — 83880 ASSAY OF NATRIURETIC PEPTIDE: CPT | Performed by: FAMILY MEDICINE

## 2018-01-01 PROCEDURE — 80053 COMPREHEN METABOLIC PANEL: CPT | Performed by: PHYSICIAN ASSISTANT

## 2018-01-01 PROCEDURE — 83605 ASSAY OF LACTIC ACID: CPT | Performed by: HOSPITALIST

## 2018-01-01 PROCEDURE — 82565 ASSAY OF CREATININE: CPT | Performed by: HOSPITALIST

## 2018-01-01 PROCEDURE — 99284 EMERGENCY DEPT VISIT MOD MDM: CPT | Performed by: PHYSICIAN ASSISTANT

## 2018-01-01 PROCEDURE — 86140 C-REACTIVE PROTEIN: CPT | Performed by: EMERGENCY MEDICINE

## 2018-01-01 PROCEDURE — 81001 URINALYSIS AUTO W/SCOPE: CPT | Performed by: EMERGENCY MEDICINE

## 2018-01-01 PROCEDURE — 85652 RBC SED RATE AUTOMATED: CPT | Performed by: EMERGENCY MEDICINE

## 2018-01-01 PROCEDURE — 93005 ELECTROCARDIOGRAM TRACING: CPT

## 2018-01-01 PROCEDURE — 25000128 H RX IP 250 OP 636: Performed by: FAMILY MEDICINE

## 2018-01-01 PROCEDURE — 93010 ELECTROCARDIOGRAM REPORT: CPT | Performed by: INTERNAL MEDICINE

## 2018-01-01 PROCEDURE — 36415 COLL VENOUS BLD VENIPUNCTURE: CPT | Performed by: FAMILY MEDICINE

## 2018-01-01 PROCEDURE — 92610 EVALUATE SWALLOWING FUNCTION: CPT | Mod: GN

## 2018-01-01 PROCEDURE — 86140 C-REACTIVE PROTEIN: CPT | Performed by: FAMILY MEDICINE

## 2018-01-01 PROCEDURE — 99284 EMERGENCY DEPT VISIT MOD MDM: CPT | Performed by: EMERGENCY MEDICINE

## 2018-01-01 PROCEDURE — 80053 COMPREHEN METABOLIC PANEL: CPT | Performed by: INTERNAL MEDICINE

## 2018-01-01 PROCEDURE — 82550 ASSAY OF CK (CPK): CPT | Performed by: INTERNAL MEDICINE

## 2018-01-01 PROCEDURE — 99222 1ST HOSP IP/OBS MODERATE 55: CPT | Mod: AI | Performed by: HOSPITALIST

## 2018-01-01 PROCEDURE — 87077 CULTURE AEROBIC IDENTIFY: CPT | Performed by: HOSPITALIST

## 2018-01-01 PROCEDURE — 99283 EMERGENCY DEPT VISIT LOW MDM: CPT

## 2018-01-01 PROCEDURE — 36415 COLL VENOUS BLD VENIPUNCTURE: CPT | Performed by: INTERNAL MEDICINE

## 2018-01-01 PROCEDURE — 87040 BLOOD CULTURE FOR BACTERIA: CPT | Performed by: HOSPITALIST

## 2018-01-01 PROCEDURE — A9270 NON-COVERED ITEM OR SERVICE: HCPCS | Mod: GY | Performed by: INTERNAL MEDICINE

## 2018-01-01 PROCEDURE — 99283 EMERGENCY DEPT VISIT LOW MDM: CPT | Mod: Z6 | Performed by: FAMILY MEDICINE

## 2018-01-01 PROCEDURE — 87040 BLOOD CULTURE FOR BACTERIA: CPT | Mod: 59 | Performed by: EMERGENCY MEDICINE

## 2018-01-01 PROCEDURE — 36415 COLL VENOUS BLD VENIPUNCTURE: CPT | Performed by: HOSPITALIST

## 2018-01-01 PROCEDURE — 70450 CT HEAD/BRAIN W/O DYE: CPT | Mod: TC

## 2018-01-01 PROCEDURE — 80053 COMPREHEN METABOLIC PANEL: CPT | Performed by: FAMILY MEDICINE

## 2018-01-01 PROCEDURE — 70553 MRI BRAIN STEM W/O & W/DYE: CPT | Mod: TC

## 2018-01-01 PROCEDURE — 83605 ASSAY OF LACTIC ACID: CPT | Performed by: FAMILY MEDICINE

## 2018-01-01 PROCEDURE — 85025 COMPLETE CBC W/AUTO DIFF WBC: CPT | Performed by: PHYSICIAN ASSISTANT

## 2018-01-01 PROCEDURE — 36415 COLL VENOUS BLD VENIPUNCTURE: CPT | Performed by: PHYSICIAN ASSISTANT

## 2018-01-01 PROCEDURE — 99285 EMERGENCY DEPT VISIT HI MDM: CPT | Performed by: INTERNAL MEDICINE

## 2018-01-01 PROCEDURE — 25000128 H RX IP 250 OP 636: Performed by: RADIOLOGY

## 2018-01-01 PROCEDURE — 25000128 H RX IP 250 OP 636: Performed by: HOSPITALIST

## 2018-01-01 PROCEDURE — 71045 X-RAY EXAM CHEST 1 VIEW: CPT | Mod: TC

## 2018-01-01 PROCEDURE — 85025 COMPLETE CBC W/AUTO DIFF WBC: CPT | Performed by: EMERGENCY MEDICINE

## 2018-01-01 PROCEDURE — 93306 TTE W/DOPPLER COMPLETE: CPT | Mod: TC

## 2018-01-01 PROCEDURE — 81001 URINALYSIS AUTO W/SCOPE: CPT | Performed by: PHYSICIAN ASSISTANT

## 2018-01-01 PROCEDURE — 85025 COMPLETE CBC W/AUTO DIFF WBC: CPT | Performed by: FAMILY MEDICINE

## 2018-01-01 PROCEDURE — 70544 MR ANGIOGRAPHY HEAD W/O DYE: CPT | Mod: TC

## 2018-01-01 PROCEDURE — 25000132 ZZH RX MED GY IP 250 OP 250 PS 637: Mod: GY | Performed by: HOSPITALIST

## 2018-01-01 PROCEDURE — 70549 MR ANGIOGRAPH NECK W/O&W/DYE: CPT | Mod: TC

## 2018-01-01 PROCEDURE — 85025 COMPLETE CBC W/AUTO DIFF WBC: CPT | Performed by: HOSPITALIST

## 2018-01-01 PROCEDURE — 99284 EMERGENCY DEPT VISIT MOD MDM: CPT | Mod: 25

## 2018-01-01 PROCEDURE — 87186 SC STD MICRODIL/AGAR DIL: CPT | Performed by: FAMILY MEDICINE

## 2018-01-01 PROCEDURE — A9270 NON-COVERED ITEM OR SERVICE: HCPCS | Mod: GY | Performed by: HOSPITALIST

## 2018-01-01 PROCEDURE — 83735 ASSAY OF MAGNESIUM: CPT | Performed by: EMERGENCY MEDICINE

## 2018-01-01 PROCEDURE — 40000786 ZZHCL STATISTIC ACTIVE MRSA SURVEILLANCE CULTURE: Performed by: HOSPITALIST

## 2018-01-01 PROCEDURE — 87086 URINE CULTURE/COLONY COUNT: CPT | Performed by: FAMILY MEDICINE

## 2018-01-01 PROCEDURE — 85025 COMPLETE CBC W/AUTO DIFF WBC: CPT | Performed by: INTERNAL MEDICINE

## 2018-01-01 PROCEDURE — 96360 HYDRATION IV INFUSION INIT: CPT

## 2018-01-01 PROCEDURE — 80048 BASIC METABOLIC PNL TOTAL CA: CPT | Performed by: FAMILY MEDICINE

## 2018-01-01 PROCEDURE — 85049 AUTOMATED PLATELET COUNT: CPT | Performed by: HOSPITALIST

## 2018-01-01 PROCEDURE — 87070 CULTURE OTHR SPECIMN AEROBIC: CPT | Performed by: HOSPITALIST

## 2018-01-01 PROCEDURE — 87088 URINE BACTERIA CULTURE: CPT | Performed by: FAMILY MEDICINE

## 2018-01-01 PROCEDURE — 40000225 ZZH STATISTIC SLP WARD VISIT

## 2018-01-01 PROCEDURE — 84484 ASSAY OF TROPONIN QUANT: CPT | Performed by: FAMILY MEDICINE

## 2018-01-01 PROCEDURE — 80053 COMPREHEN METABOLIC PANEL: CPT | Performed by: HOSPITALIST

## 2018-01-01 PROCEDURE — 71046 X-RAY EXAM CHEST 2 VIEWS: CPT | Mod: TC

## 2018-01-01 PROCEDURE — 96361 HYDRATE IV INFUSION ADD-ON: CPT

## 2018-01-01 PROCEDURE — 99232 SBSQ HOSP IP/OBS MODERATE 35: CPT | Performed by: INTERNAL MEDICINE

## 2018-01-01 PROCEDURE — 85610 PROTHROMBIN TIME: CPT | Performed by: EMERGENCY MEDICINE

## 2018-01-01 PROCEDURE — 27210995 ZZH RX 272: Performed by: INTERNAL MEDICINE

## 2018-01-01 PROCEDURE — 12000000 ZZH R&B MED SURG/OB

## 2018-01-01 PROCEDURE — 93306 TTE W/DOPPLER COMPLETE: CPT | Mod: 26 | Performed by: INTERNAL MEDICINE

## 2018-01-01 PROCEDURE — 99285 EMERGENCY DEPT VISIT HI MDM: CPT | Mod: 25

## 2018-01-01 PROCEDURE — 36415 COLL VENOUS BLD VENIPUNCTURE: CPT | Performed by: EMERGENCY MEDICINE

## 2018-01-01 PROCEDURE — 80048 BASIC METABOLIC PNL TOTAL CA: CPT | Performed by: HOSPITALIST

## 2018-01-01 PROCEDURE — 87186 SC STD MICRODIL/AGAR DIL: CPT | Performed by: HOSPITALIST

## 2018-01-01 PROCEDURE — 80053 COMPREHEN METABOLIC PANEL: CPT | Performed by: EMERGENCY MEDICINE

## 2018-01-01 PROCEDURE — 85027 COMPLETE CBC AUTOMATED: CPT | Performed by: HOSPITALIST

## 2018-01-01 RX ORDER — OXYBUTYNIN CHLORIDE 5 MG/1
5 TABLET, EXTENDED RELEASE ORAL DAILY
Qty: 30 TABLET | Refills: 3 | Status: SHIPPED | OUTPATIENT
Start: 2018-01-01 | End: 2018-01-01

## 2018-01-01 RX ORDER — AMLODIPINE BESYLATE 5 MG/1
5 TABLET ORAL DAILY
Status: DISCONTINUED | OUTPATIENT
Start: 2018-01-01 | End: 2018-01-01

## 2018-01-01 RX ORDER — TOLTERODINE TARTRATE 1 MG/1
1 TABLET, EXTENDED RELEASE ORAL
Status: DISCONTINUED | OUTPATIENT
Start: 2018-01-01 | End: 2018-01-01

## 2018-01-01 RX ORDER — CEFTRIAXONE SODIUM 1 G/50ML
1 INJECTION, SOLUTION INTRAVENOUS EVERY 24 HOURS
Status: DISCONTINUED | OUTPATIENT
Start: 2018-01-01 | End: 2018-12-21 | Stop reason: HOSPADM

## 2018-01-01 RX ORDER — MENTHOL
LOZENGE MUCOUS MEMBRANE 2 TIMES DAILY
COMMUNITY

## 2018-01-01 RX ORDER — AMLODIPINE BESYLATE 5 MG/1
5 TABLET ORAL DAILY
Qty: 14 TABLET | Refills: 0 | Status: ON HOLD | OUTPATIENT
Start: 2018-01-01 | End: 2018-01-01

## 2018-01-01 RX ORDER — SODIUM CHLORIDE 9 MG/ML
1000 INJECTION, SOLUTION INTRAVENOUS CONTINUOUS
Status: DISCONTINUED | OUTPATIENT
Start: 2018-01-01 | End: 2018-01-01 | Stop reason: HOSPADM

## 2018-01-01 RX ORDER — HEPARIN SODIUM 5000 [USP'U]/ML
5000 INJECTION, SOLUTION INTRAVENOUS; SUBCUTANEOUS EVERY 12 HOURS
Status: DISCONTINUED | OUTPATIENT
Start: 2018-01-01 | End: 2018-12-21 | Stop reason: HOSPADM

## 2018-01-01 RX ORDER — LABETALOL HYDROCHLORIDE 5 MG/ML
20 INJECTION, SOLUTION INTRAVENOUS ONCE
Status: DISCONTINUED | OUTPATIENT
Start: 2018-01-01 | End: 2018-01-01

## 2018-01-01 RX ORDER — GADOBUTROL 604.72 MG/ML
10 INJECTION INTRAVENOUS ONCE
Status: COMPLETED | OUTPATIENT
Start: 2018-01-01 | End: 2018-01-01

## 2018-01-01 RX ORDER — FINASTERIDE 5 MG/1
5 TABLET, FILM COATED ORAL
COMMUNITY
Start: 2018-01-01

## 2018-01-01 RX ORDER — MENTHOL
LOZENGE MUCOUS MEMBRANE 2 TIMES DAILY
Status: DISCONTINUED | OUTPATIENT
Start: 2018-01-01 | End: 2018-12-21 | Stop reason: HOSPADM

## 2018-01-01 RX ORDER — NALOXONE HYDROCHLORIDE 0.4 MG/ML
.1-.4 INJECTION, SOLUTION INTRAMUSCULAR; INTRAVENOUS; SUBCUTANEOUS
Status: DISCONTINUED | OUTPATIENT
Start: 2018-01-01 | End: 2018-12-21 | Stop reason: HOSPADM

## 2018-01-01 RX ORDER — AMLODIPINE BESYLATE 5 MG/1
5 TABLET ORAL DAILY
COMMUNITY

## 2018-01-01 RX ORDER — TERAZOSIN 5 MG/1
CAPSULE ORAL
COMMUNITY
Start: 2018-01-01

## 2018-01-01 RX ORDER — MIRABEGRON 25 MG/1
50 TABLET, FILM COATED, EXTENDED RELEASE ORAL DAILY
Status: DISCONTINUED | OUTPATIENT
Start: 2018-01-01 | End: 2018-01-01

## 2018-01-01 RX ORDER — ATORVASTATIN CALCIUM 40 MG/1
40 TABLET, FILM COATED ORAL
COMMUNITY
Start: 2018-01-01

## 2018-01-01 RX ORDER — SODIUM CHLORIDE 450 MG/100ML
INJECTION, SOLUTION INTRAVENOUS CONTINUOUS
Status: DISCONTINUED | OUTPATIENT
Start: 2018-01-01 | End: 2018-12-21 | Stop reason: HOSPADM

## 2018-01-01 RX ORDER — FINASTERIDE 5 MG/1
5 TABLET, FILM COATED ORAL DAILY
Status: DISCONTINUED | OUTPATIENT
Start: 2018-01-01 | End: 2018-01-01

## 2018-01-01 RX ORDER — TERAZOSIN 5 MG/1
5 CAPSULE ORAL AT BEDTIME
Status: DISCONTINUED | OUTPATIENT
Start: 2018-01-01 | End: 2018-01-01

## 2018-01-01 RX ORDER — TOLTERODINE TARTRATE 2 MG/1
2 TABLET, EXTENDED RELEASE ORAL
Status: DISCONTINUED | OUTPATIENT
Start: 2018-01-01 | End: 2018-01-01

## 2018-01-01 RX ORDER — MIRABEGRON 25 MG/1
25 TABLET, FILM COATED, EXTENDED RELEASE ORAL DAILY
Status: DISCONTINUED | OUTPATIENT
Start: 2018-01-01 | End: 2018-01-01

## 2018-01-01 RX ORDER — ONDANSETRON 2 MG/ML
4 INJECTION INTRAMUSCULAR; INTRAVENOUS EVERY 6 HOURS PRN
Status: DISCONTINUED | OUTPATIENT
Start: 2018-01-01 | End: 2018-12-21 | Stop reason: HOSPADM

## 2018-01-01 RX ORDER — HEPARIN SODIUM 5000 [USP'U]/.5ML
5000 INJECTION, SOLUTION INTRAVENOUS; SUBCUTANEOUS EVERY 12 HOURS
Status: DISCONTINUED | OUTPATIENT
Start: 2018-01-01 | End: 2018-01-01

## 2018-01-01 RX ORDER — CEPHALEXIN 500 MG/1
500 CAPSULE ORAL 4 TIMES DAILY
Qty: 28 CAPSULE | Refills: 0 | Status: SHIPPED | OUTPATIENT
Start: 2018-01-01 | End: 2018-01-01

## 2018-01-01 RX ORDER — MIRABEGRON 50 MG/1
50 TABLET, EXTENDED RELEASE ORAL DAILY
COMMUNITY
Start: 2018-01-01

## 2018-01-01 RX ORDER — SODIUM CHLORIDE 9 MG/ML
INJECTION, SOLUTION INTRAVENOUS CONTINUOUS
Status: DISCONTINUED | OUTPATIENT
Start: 2018-01-01 | End: 2018-01-01

## 2018-01-01 RX ORDER — ALLOPURINOL 100 MG/1
TABLET ORAL
COMMUNITY
Start: 2018-01-01 | End: 2018-01-01

## 2018-01-01 RX ORDER — SODIUM CHLORIDE 9 MG/ML
INJECTION, SOLUTION INTRAVENOUS CONTINUOUS
Status: DISCONTINUED | OUTPATIENT
Start: 2018-01-01 | End: 2018-01-01 | Stop reason: HOSPADM

## 2018-01-01 RX ORDER — AMLODIPINE BESYLATE 5 MG/1
5 TABLET ORAL ONCE
Status: COMPLETED | OUTPATIENT
Start: 2018-01-01 | End: 2018-01-01

## 2018-01-01 RX ORDER — CLOPIDOGREL BISULFATE 75 MG/1
75 TABLET ORAL
COMMUNITY
Start: 2018-01-01

## 2018-01-01 RX ORDER — ONDANSETRON 4 MG/1
4 TABLET, ORALLY DISINTEGRATING ORAL EVERY 6 HOURS PRN
Status: DISCONTINUED | OUTPATIENT
Start: 2018-01-01 | End: 2018-12-21 | Stop reason: HOSPADM

## 2018-01-01 RX ORDER — CLONIDINE HYDROCHLORIDE 0.1 MG/1
0.1 TABLET ORAL ONCE
Status: COMPLETED | OUTPATIENT
Start: 2018-01-01 | End: 2018-01-01

## 2018-01-01 RX ORDER — CYANOCOBALAMIN (VITAMIN B-12) 1000 MCG
TABLET, EXTENDED RELEASE ORAL
COMMUNITY
Start: 2014-03-10

## 2018-01-01 RX ORDER — GADOBUTROL 604.72 MG/ML
7.5 INJECTION INTRAVENOUS ONCE
Status: COMPLETED | OUTPATIENT
Start: 2018-01-01 | End: 2018-01-01

## 2018-01-01 RX ORDER — LIDOCAINE 40 MG/G
CREAM TOPICAL
Status: DISCONTINUED | OUTPATIENT
Start: 2018-01-01 | End: 2018-01-01 | Stop reason: HOSPADM

## 2018-01-01 RX ORDER — TROSPIUM CHLORIDE 20 MG/1
20 TABLET, FILM COATED ORAL
COMMUNITY

## 2018-01-01 RX ADMIN — AMLODIPINE BESYLATE 5 MG: 5 TABLET ORAL at 09:28

## 2018-01-01 RX ADMIN — CEFTRIAXONE SODIUM 1 G: 1 INJECTION, SOLUTION INTRAVENOUS at 18:26

## 2018-01-01 RX ADMIN — SODIUM CHLORIDE: 9 INJECTION, SOLUTION INTRAVENOUS at 17:45

## 2018-01-01 RX ADMIN — MIRABEGRON 25 MG: 25 TABLET, FILM COATED, EXTENDED RELEASE ORAL at 09:28

## 2018-01-01 RX ADMIN — SODIUM CHLORIDE: 9 INJECTION, SOLUTION INTRAVENOUS at 13:22

## 2018-01-01 RX ADMIN — GADOBUTROL 10 ML: 604.72 INJECTION INTRAVENOUS at 11:40

## 2018-01-01 RX ADMIN — SODIUM CHLORIDE: 4.5 INJECTION, SOLUTION INTRAVENOUS at 15:37

## 2018-01-01 RX ADMIN — TOLTERODINE TARTRATE 1 MG: 1 TABLET, FILM COATED ORAL at 15:38

## 2018-01-01 RX ADMIN — TOLTERODINE TARTRATE 2 MG: 2 TABLET, FILM COATED ORAL at 18:28

## 2018-01-01 RX ADMIN — TERAZOSIN HYDROCHLORIDE 5 MG: 5 CAPSULE ORAL at 21:02

## 2018-01-01 RX ADMIN — HEPARIN SODIUM 5000 UNITS: 5000 INJECTION, SOLUTION INTRAVENOUS; SUBCUTANEOUS at 05:32

## 2018-01-01 RX ADMIN — Medication: at 09:28

## 2018-01-01 RX ADMIN — SODIUM CHLORIDE 1000 ML: 9 INJECTION, SOLUTION INTRAVENOUS at 14:19

## 2018-01-01 RX ADMIN — VANCOMYCIN HYDROCHLORIDE 750 MG: 750 INJECTION, POWDER, LYOPHILIZED, FOR SOLUTION INTRAVENOUS at 19:42

## 2018-01-01 RX ADMIN — GADOBUTROL 7.5 ML: 604.72 INJECTION INTRAVENOUS at 18:39

## 2018-01-01 RX ADMIN — HEPARIN SODIUM 5000 UNITS: 5000 INJECTION, SOLUTION INTRAVENOUS; SUBCUTANEOUS at 06:35

## 2018-01-01 RX ADMIN — TERAZOSIN HYDROCHLORIDE 5 MG: 5 CAPSULE ORAL at 22:30

## 2018-01-01 RX ADMIN — SODIUM CHLORIDE: 4.5 INJECTION, SOLUTION INTRAVENOUS at 08:23

## 2018-01-01 RX ADMIN — Medication: at 08:23

## 2018-01-01 RX ADMIN — CLONIDINE HYDROCHLORIDE 0.1 MG: 0.1 TABLET ORAL at 22:09

## 2018-01-01 RX ADMIN — SODIUM CHLORIDE: 9 INJECTION, SOLUTION INTRAVENOUS at 11:09

## 2018-01-01 RX ADMIN — SODIUM CHLORIDE: 4.5 INJECTION, SOLUTION INTRAVENOUS at 00:30

## 2018-01-01 RX ADMIN — HEPARIN SODIUM 5000 UNITS: 5000 INJECTION, SOLUTION INTRAVENOUS; SUBCUTANEOUS at 18:30

## 2018-01-01 RX ADMIN — AMLODIPINE BESYLATE 5 MG: 5 TABLET ORAL at 22:09

## 2018-01-01 RX ADMIN — LIDOCAINE HYDROCHLORIDE: 20 JELLY TOPICAL at 13:38

## 2018-01-01 RX ADMIN — Medication: at 21:04

## 2018-01-01 RX ADMIN — FINASTERIDE 5 MG: 5 TABLET, FILM COATED ORAL at 09:28

## 2018-01-01 RX ADMIN — CEFTRIAXONE SODIUM 1 G: 1 INJECTION, SOLUTION INTRAVENOUS at 17:46

## 2018-01-01 RX ADMIN — FINASTERIDE 5 MG: 5 TABLET, FILM COATED ORAL at 19:28

## 2018-01-01 RX ADMIN — HEPARIN SODIUM 5000 UNITS: 5000 INJECTION, SOLUTION INTRAVENOUS; SUBCUTANEOUS at 17:46

## 2018-01-01 RX ADMIN — CEFTRIAXONE SODIUM 1 G: 1 INJECTION, SOLUTION INTRAVENOUS at 18:25

## 2018-01-01 RX ADMIN — SODIUM CHLORIDE 1000 ML: 9 INJECTION, SOLUTION INTRAVENOUS at 18:20

## 2018-01-01 RX ADMIN — TOLTERODINE TARTRATE 2 MG: 2 TABLET, FILM COATED ORAL at 07:35

## 2018-01-01 RX ADMIN — SODIUM CHLORIDE 1000 ML: 9 INJECTION, SOLUTION INTRAVENOUS at 17:09

## 2018-01-01 RX ADMIN — SODIUM CHLORIDE: 4.5 INJECTION, SOLUTION INTRAVENOUS at 14:28

## 2018-01-01 ASSESSMENT — ENCOUNTER SYMPTOMS
BEHAVIOR CHANGES: 0
FEVER: 0
GASTROINTESTINAL NEGATIVE: 1
APPETITE CHANGE: 0
COLOR CHANGE: 0
FEVER: 0
HEADACHES: 0
LETHARGY: 0
BLOOD IN STOOL: 0
HEADACHES: 0
CONSTITUTIONAL NEGATIVE: 1
FATIGUE: 0
UNRESPONSIVENESS: 0
BACK PAIN: 0
ACTIVITY CHANGE: 0
FLANK PAIN: 0
NECK PAIN: 0
DIZZINESS: 0
MEMORY LOSS: 1
ACTIVITY CHANGE: 1
CHILLS: 0
FEVER: 0
NUMBNESS: 0
BACK PAIN: 1
FLANK PAIN: 0
DIFFICULTY URINATING: 1
NAUSEA: 0
RESPIRATORY NEGATIVE: 1
ABDOMINAL PAIN: 0
CHILLS: 0
WEAKNESS: 1
DIAPHORESIS: 0
NAUSEA: 0
SPEECH DIFFICULTY: 0
SLEEP DISTURBANCE: 0
WEAKNESS: 0
GASTROINTESTINAL NEGATIVE: 1
VOMITING: 0
LIGHT-HEADEDNESS: 0
CHILLS: 0
ABDOMINAL DISTENTION: 0
DIZZINESS: 0
RESPIRATORY NEGATIVE: 1
WEAKNESS: 1
COUGH: 0
ABDOMINAL PAIN: 0
CONFUSION: 0
APPETITE CHANGE: 0
VOMITING: 0
PALPITATIONS: 0
MYALGIAS: 1
DIAPHORESIS: 0
TREMORS: 0
NUMBNESS: 0
SHORTNESS OF BREATH: 0
CARDIOVASCULAR NEGATIVE: 1
CARDIOVASCULAR NEGATIVE: 1
ANAL BLEEDING: 0
MUSCULOSKELETAL NEGATIVE: 1
FREQUENCY: 0
SHORTNESS OF BREATH: 0
DISORIENTATION: 0
BACK PAIN: 0
MYALGIAS: 0
VOICE CHANGE: 0
NECK PAIN: 0
WHEEZING: 0
DYSURIA: 0
CHEST TIGHTNESS: 0
UNEXPECTED WEIGHT CHANGE: 0
ALTERED MENTAL STATUS: 1
DIZZINESS: 0
LIGHT-HEADEDNESS: 0

## 2018-01-01 ASSESSMENT — ACTIVITIES OF DAILY LIVING (ADL)
ADLS_ACUITY_SCORE: 32
ADLS_ACUITY_SCORE: 32
ADLS_ACUITY_SCORE: 29
ADLS_ACUITY_SCORE: 31
ADLS_ACUITY_SCORE: 31
ADLS_ACUITY_SCORE: 32
ADLS_ACUITY_SCORE: 32
ADLS_ACUITY_SCORE: 35
ADLS_ACUITY_SCORE: 32
ADLS_ACUITY_SCORE: 32
ADLS_ACUITY_SCORE: 26
ADLS_ACUITY_SCORE: 35
ADLS_ACUITY_SCORE: 32

## 2018-01-01 ASSESSMENT — MIFFLIN-ST. JEOR
SCORE: 1101
SCORE: 1094

## 2018-06-13 NOTE — ED AVS SNAPSHOT
" [facility]    [address] [address]    Phone:  [phone]                                       Danny Muniz   MRN: [id number]    Department[facility]  [facility]   Date of Visit:  6/13/2018           Patient Information     Date Of Birth          1/27/1928        Your diagnoses for this visit were:     Chronic kidney disease, unspecified CKD stage     Hypertension, uncontrolled        You were seen by Redd Vargas MD.      Follow-up Information     Follow up with Erick Estevez MD. Schedule an appointment as soon as possible for a visit in 2 days.    Specialty:  Family Practice    Contact information:    [facility]  [address] [place] [address]  [phone]          Discharge Instructions         Discharge Instructions: Taking Blood Pressure Medications  You have been diagnosed with high blood pressure (hypertension). Diet and exercise help control high blood pressure. Many people also need the help of one or more medicines. Here are the main types of blood pressure medicines and how they work.  Diuretics  Diuretics are sometimes called \"water pills\" because they work in the kidney and flush excess water and sodium (salt) from the body. These are one of the most common and  important medicines for the management of blood pressure.  Beta-blockers  Beta-blockers reduce nerve impulses to the heart and blood vessels. This makes the heart beat slower and with less force. Your blood pressure drops, and your heart does not have to work as hard, which may improve pumping function.  ACE inhibitors  ACE inhibitors cause the vessels to relax. This helps the blood flow better and lowers blood pressure.  Angiotensin antagonists  Angiotensin antagonists shield blood vessels from a hormone that causes the blood vessels to get stiff and narrow. Your vessels become wider, and your blood pressure goes down.  Calcium channel blockers (CCBs)  CCBs keep calcium from entering " the muscle cells of the heart and blood vessels. This causes blood vessels to relax, and your blood pressure goes down.  Alpha-blockers  Alpha-blockers reduce nerve impulses to blood vessels. This allows blood to pass easily, causing blood pressure to go down.  Alpha-beta blockers  Alpha-beta blockers work the same way as alpha-blockers but also slow your heartbeat. As a result, less blood is pumped through your blood vessels and your blood pressure goes down.  Vasodilators  Vasodilators directly open blood vessels by relaxing the muscle in the vessel walls, causing blood pressure to go down.  Date Last Reviewed: 4/27/2016 2000-2017 [employer]. [address] [place] [address], [place] [address]. All rights reserved. This information is not intended as a substitute for professional medical care. Always follow your healthcare professional's instructions.             Review of your medicines      START taking        Dose / Directions Last dose taken    amLODIPine 5 MG tablet   Commonly known as:  NORVASC   Dose:  5 mg   Quantity:  14 tablet        Take 1 tablet (5 mg) by mouth daily for 14 days   Refills:  0          Our records show that you are taking the medicines listed below. If these are incorrect, please call your family doctor or clinic.        Dose / Directions Last dose taken    ALLOPURINOL PO   Dose:  50 mg        Take 50 mg by mouth daily   Refills:  0        FLOMAX 0.4 MG capsule   Dose:  0.4 mg   Generic drug:  tamsulosin        Take 0.4 mg by mouth daily   Refills:  0        VITAMIN B 12 PO        Take by mouth daily   Refills:  0                Prescriptions were sent or printed at these locations (1 Prescription)                   Other Prescriptions                Printed at Department/Unit printer (1 of 1)         amLODIPine (NORVASC) 5 MG tablet                Procedures and tests performed during your visit     CBC with platelets differential    CK total    CT Head w/o Contrast     Comprehensive metabolic panel    EKG 12-lead, tracing only    XR Chest 2 Views      Orders Needing Specimen Collection     Ordered          06/13/18 2034  UA with Microscopic reflex to Culture - STAT, [facility]: STAT, Status: Sent     Scheduled Task Status   06/13/18 2034 [facility] Reminder: Open   Order Class:  PCU Collect                  Pending Results     Date and Time Order Name Status Description    6/13/2018 2034 CT Head w/o Contrast In process     6/13/2018 2034 XR Chest 2 Views In process             Pending Culture Results     No orders found from 6/11/2018 to 6/14/2018.            Thank you for choosing [facility]       Thank you for choosing [facility] for your care. Our goal is always to provide you with excellent care. Hearing back from our patients is one way we can continue to improve our services. Please take a few minutes to complete the written survey that you may receive in the mail after you visit with us. Thank you!        Care EveryWhere ID     This is your Care EveryWhere ID. This could be used by other organizations to access your [facility] medical records  [id number]        Equal Access to Services     CHEIKH HOYT : Hadii singh millero Somercedez, waaxda luqadaha, qaybta kaalmada adeteresita, ismael arzate. So [facility] [phone].    ATENCIÓN: Si habla español, tiene a rodriguez disposición servicios gratuitos de asistencia lingüística. Llame al [phone].    We comply with applicable federal civil rights laws and [place] laws. We do not discriminate on the basis of race, color, national origin, age, disability, sex, sexual orientation, or gender identity.            After Visit Summary       This is your record. Keep this with you and show to your community pharmacist(s) and doctor(s) at your next visit.

## 2018-06-13 NOTE — ED AVS SNAPSHOT
[facility]    [address]    [place] [address]    Phone:  [phone]                                       Danny Muniz   MRN: [id number]    Department:  [facility]   Date of Visit:  6/13/2018           After Visit Summary Signature Page     I have received my discharge instructions, and my questions have been answered. I have discussed any challenges I see with this plan with the nurse or doctor.    ..........................................................................................................................................  Patient/Patient Representative Signature      ..........................................................................................................................................  Patient Representative Print Name and Relationship to Patient    ..................................................               ................................................  Date                                            Time    ..........................................................................................................................................  Reviewed by Signature/Title    ...................................................              ..............................................  Date                                                            Time

## 2018-06-14 NOTE — DISCHARGE INSTRUCTIONS
"  Discharge Instructions: Taking Blood Pressure Medications  You have been diagnosed with high blood pressure (hypertension). Diet and exercise help control high blood pressure. Many people also need the help of one or more medicines. Here are the main types of blood pressure medicines and how they work.  Diuretics  Diuretics are sometimes called \"water pills\" because they work in the kidney and flush excess water and sodium (salt) from the body. These are one of the most common and  important medicines for the management of blood pressure.  Beta-blockers  Beta-blockers reduce nerve impulses to the heart and blood vessels. This makes the heart beat slower and with less force. Your blood pressure drops, and your heart does not have to work as hard, which may improve pumping function.  ACE inhibitors  ACE inhibitors cause the vessels to relax. This helps the blood flow better and lowers blood pressure.  Angiotensin antagonists  Angiotensin antagonists shield blood vessels from a hormone that causes the blood vessels to get stiff and narrow. Your vessels become wider, and your blood pressure goes down.  Calcium channel blockers (CCBs)  CCBs keep calcium from entering the muscle cells of the heart and blood vessels. This causes blood vessels to relax, and your blood pressure goes down.  Alpha-blockers  Alpha-blockers reduce nerve impulses to blood vessels. This allows blood to pass easily, causing blood pressure to go down.  Alpha-beta blockers  Alpha-beta blockers work the same way as alpha-blockers but also slow your heartbeat. As a result, less blood is pumped through your blood vessels and your blood pressure goes down.  Vasodilators  Vasodilators directly open blood vessels by relaxing the muscle in the vessel walls, causing blood pressure to go down.  Date Last Reviewed: 4/27/2016 2000-2017 [employer]. [address] [place] [address], [place] [address]. All rights reserved. This information is not " intended as a substitute for professional medical care. Always follow your healthcare professional's instructions.

## 2018-06-14 NOTE — ED NOTES
Discharge instructions given. Verbalized understanding. Assisted into wheelchair. Wheeled out of ED by family.

## 2018-06-14 NOTE — TELEPHONE ENCOUNTER
I did follow up with this phone call for checking on his hypertension medicine and memory issues.  His niece, Bridget was there at the apartment.  This patient offers to have his niece talk to me.  I did explain that a referral could be made to home care though it would be private pay, and a referral can be made to the senior linkage line.  Choice of vendor offered and she did choose [facility].  I did call [facility] to complete the referral.  Referral to [facility] line as well.

## 2018-06-15 NOTE — ED PROVIDER NOTES
"  History     Chief Complaint   Patient presents with     Memory Loss     forgetful, he called his niece in [place] due to he felt confused and didn't feel right. niece notes \"he is usually pretty sharp and we usually see him on hoidays\"     Patient is a 89 year old male presenting with altered mental status. The history is provided by the patient and a relative.   Altered Mental Status   Presenting symptoms: memory loss    Presenting symptoms: no behavior changes, no combativeness, no confusion, no disorientation, no lethargy, no partial responsiveness and no unresponsiveness    Severity:  Mild  Timing:  Intermittent  Progression:  Worsening  Chronicity:  New  Associated symptoms: no abdominal pain, no fever, no headaches, no light-headedness, no nausea, no palpitations, no rash, no vomiting and no weakness          Problem List:    There are no active problems to display for this patient.       Past Medical History:    No past medical history on file.    Past Surgical History:    No past surgical history on file.    Family History:    No family history on file.    Social History:  Marital Status:  Single [1]  Social History   Substance Use Topics     Smoking status: Not on file     Smokeless tobacco: Not on file     Alcohol use Not on file        Medications:      amLODIPine (NORVASC) 5 MG tablet   ALLOPURINOL PO   Cyanocobalamin (VITAMIN B 12 PO)   tamsulosin (FLOMAX) 0.4 MG capsule         Review of Systems   Constitutional: Negative for chills, diaphoresis and fever.   HENT: Negative for voice change.    Eyes: Negative for visual disturbance.   Respiratory: Negative for cough, chest tightness, shortness of breath and wheezing.    Cardiovascular: Negative for chest pain, palpitations and leg swelling.   Gastrointestinal: Negative for abdominal distention, abdominal pain, anal bleeding, blood in stool, nausea and vomiting.   Genitourinary: Negative for decreased urine volume, dysuria, flank pain and frequency. "   Musculoskeletal: Negative for back pain, gait problem, myalgias and neck pain.   Skin: Negative for color change, pallor and rash.   Neurological: Negative for dizziness, syncope, weakness, light-headedness, numbness and headaches.   Psychiatric/Behavioral: Positive for memory loss. Negative for confusion, sleep disturbance and suicidal ideas.       Physical Exam   BP: (!) 201/87  Pulse: 60  Heart Rate: 69  Temp: 96.9  F (36.1  C)  Resp: 16  SpO2: 98 %      Physical Exam   Constitutional: He is oriented to person, place, and time. He appears well-developed and well-nourished.   HENT:   Head: Normocephalic and atraumatic.   Mouth/Throat: No oropharyngeal exudate.   Eyes: Conjunctivae are normal. Pupils are equal, round, and reactive to light.   Neck: Normal range of motion. Neck supple. No JVD present. No tracheal deviation present. No thyromegaly present.   Cardiovascular: Normal rate, regular rhythm, normal heart sounds and intact distal pulses.  Exam reveals no gallop and no friction rub.    No murmur heard.  Pulmonary/Chest: Effort normal and breath sounds normal. No stridor. No respiratory distress. He has no wheezes. He has no rales. He exhibits no tenderness.   Abdominal: Soft. Bowel sounds are normal. He exhibits no distension and no mass. There is no tenderness. There is no rebound and no guarding.   Musculoskeletal: Normal range of motion. He exhibits no edema or tenderness.   Lymphadenopathy:     He has no cervical adenopathy.   Neurological: He is alert and oriented to person, place, and time.   Skin: Skin is warm and dry. No rash noted. No erythema. No pallor.   Psychiatric: He has a normal mood and affect. His speech is normal and behavior is normal. Judgment and thought content normal. His affect is not blunt and not labile. He is not slowed, not withdrawn and not actively hallucinating. Thought content is not paranoid and not delusional. He does not express impulsivity or inappropriate judgment. He  does not exhibit a depressed mood. He expresses no homicidal and no suicidal ideation. He expresses no suicidal plans and no homicidal plans.   Nursing note and vitals reviewed.      ED Course     ED Course     Procedures                   No results found for this or any previous visit (from the past 24 hour(s)).    Medications   cloNIDine (CATAPRES) tablet 0.1 mg (0.1 mg Oral Given 6/13/18 2209)   amLODIPine (NORVASC) tablet 5 mg (5 mg Oral Given 6/13/18 2209)       Assessments & Plan (with Medical Decision Making)   Mild intermittent memory loss, he called his niece today and complained he has been more forgetfull recental  Pt AAox3 in ER, denies any compliant  Uncontrolled HTN, pt is not taking any BP medication. As per him he BP was stable his doctor discontinued BP med.  Basic workup in ER was done, no significant abnormality except worsening kidney function test.   CKD, getting worse, likely due to uncontrolled HTN  I advised patient and his family, niece to follow with his PMD for optimzing his BP med and reevaluation regarding his memory problem  Also  consulted to follow with the case, home health aid for assisting pt with medication.     I have reviewed the nursing notes.    I have reviewed the findings, diagnosis, plan and need for follow up with the patient.      Discharge Medication List as of 6/13/2018 11:10 PM      START taking these medications    Details   amLODIPine (NORVASC) 5 MG tablet Take 1 tablet (5 mg) by mouth daily for 14 days, Disp-14 tablet, R-0, Local Print             Final diagnoses:   Chronic kidney disease, unspecified CKD stage   Hypertension, uncontrolled       6/13/2018   [facility]     Redd Vargas MD  06/15/18 8746

## 2018-07-25 NOTE — ED AVS SNAPSHOT
[facility]    [address]    [place] [address]    Phone:  [phone]                                       Danny Muniz   MRN: [id number]    Department:  [facility]   Date of Visit:  7/25/2018           After Visit Summary Signature Page     I have received my discharge instructions, and my questions have been answered. I have discussed any challenges I see with this plan with the nurse or doctor.    ..........................................................................................................................................  Patient/Patient Representative Signature      ..........................................................................................................................................  Patient Representative Print Name and Relationship to Patient    ..................................................               ................................................  Date                                            Time    ..........................................................................................................................................  Reviewed by Signature/Title    ...................................................              ..............................................  Date                                                            Time

## 2018-07-25 NOTE — ED NOTES
Denies any pain. Able to move all extremities. Denies any neck pain.   Hx fall 1 week ago. Bruising noted to right neck and right occipital region. Hematoma noted to right occipital region.   Scab on right elbow. Denies any numbness or tingling.   States he rolled out of bed this am. Denies LOC. Denies any head or neck pain.   Awake. Negative fast exam. Increase in falls.   C/o dizziness with standing.   Lives at home alone. States he feels safe at home.

## 2018-07-25 NOTE — PROGRESS NOTES
I did reach out to this patient's niece after reviewing his previous record.  He is having falls.  He had [facility] and I spoke with Catrachita who indicated they would reinstate care for him especially since he has had these falls.    It appears that he saw Dr RONEN Estevez on June 19.  Will call Bridget to inquire what would work in her schedule to see his primary care.

## 2018-07-25 NOTE — DISCHARGE INSTRUCTIONS
Please follow-up with Dr. Estevez.   August 7 Tuesday   3:15     Stay hydrated.     Return here for any other concerns.     [facility] will call to come back in and see you

## 2018-07-25 NOTE — ED PROVIDER NOTES
History     Chief Complaint   Patient presents with     Fall     c/o dizziness. rolled out of bed this am. no injuries this am. no LOC. increase in falls. EMS reports going for a lift assist last week and at that time pt refused to come to ED.      The history is provided by the patient.     Danny Muniz is a 89 year old male who presented to the emergency department via EMS for evaluation of a fall.  Apparently Danny has been experiencing some mild dizziness with position change recently and has had a few falls.  EMS is responded over the last week or so and he has refused treatment and transfer.  Today he was found lying on the floor next to his bed without any apparent injury but the supervisor at his apartment would like him checked out.  Danny has absolutely no symptoms or concerns for me at this point.  Tells me he feels at his baseline.  Denies chest pains, shortness of breath, cough, abdominal pain, dizziness, fevers, or any other questions or concerns.    Problem List:    There are no active problems to display for this patient.       Past Medical History:    No past medical history on file.    Past Surgical History:    No past surgical history on file.    Family History:    No family history on file.    Social History:  Marital Status:  Single [1]  Social History   Substance Use Topics     Smoking status: Not on file     Smokeless tobacco: Not on file     Alcohol use Not on file        Medications:      allopurinol (ZYLOPRIM) 100 MG tablet   amLODIPine (NORVASC) 5 MG tablet   atorvastatin (LIPITOR) 40 MG tablet   clopidogrel (PLAVIX) 75 MG tablet   Cyanocobalamin (VITAMIN B-12 CR) 1000 MCG TBCR   finasteride (PROSCAR) 5 MG tablet   terazosin (HYTRIN) 5 MG capsule         Review of Systems   Constitutional: Negative for activity change, appetite change, chills and fever.   Respiratory: Negative for shortness of breath.    Cardiovascular: Negative for chest pain.   Gastrointestinal: Negative for abdominal  pain, nausea and vomiting.   Genitourinary: Negative.    Musculoskeletal: Negative for back pain and neck pain.   Skin: Negative.    Neurological: Negative for dizziness and light-headedness.       Physical Exam   BP: 133/76  Heart Rate: 70  Temp: 97.6  F (36.4  C)  Resp: 18  SpO2: 95 %      Physical Exam   Constitutional: He appears well-developed and well-nourished.   Pleasant and talkative   HENT:   Right Ear: External ear normal.   Left Ear: External ear normal.   Mouth/Throat: Oropharynx is clear and moist.   Moderate size hematoma to the right mastoid.   Eyes: Conjunctivae and EOM are normal. Pupils are equal, round, and reactive to light.   Cardiovascular: Normal rate and regular rhythm.    Pulmonary/Chest: Effort normal and breath sounds normal.   Abdominal: Soft. There is no tenderness. There is no guarding.   Musculoskeletal:   No evidence of hip discomfort on passive range of motion.  Knees are unremarkable.  Bilateral shoulders are unremarkable.   Neurological: He is alert.   Oriented the person place and time.   Skin: Skin is warm and dry.   Psychiatric: He has a normal mood and affect.   Nursing note and vitals reviewed.      ED Course     ED Course     Procedures               Critical Care time:  none               Results for orders placed or performed during the hospital encounter of 07/25/18 (from the past 24 hour(s))   CBC with platelets differential   Result Value Ref Range    WBC 6.0 4.0 - 11.0 10e9/L    RBC Count 4.26 (L) 4.4 - 5.9 10e12/L    Hemoglobin 13.0 (L) 13.3 - 17.7 g/dL    Hematocrit 37.8 (L) 40.0 - 53.0 %    MCV 89 78 - 100 fl    MCH 30.5 26.5 - 33.0 pg    MCHC 34.4 31.5 - 36.5 g/dL    RDW 13.6 10.0 - 15.0 %    Platelet Count 178 150 - 450 10e9/L    Diff Method Automated Method     % Neutrophils 82.6 %    % Lymphocytes 8.7 %    % Monocytes 7.7 %    % Eosinophils 0.2 %    % Basophils 0.5 %    % Immature Granulocytes 0.3 %    Nucleated RBCs 0 0 /100    Absolute Neutrophil 5.0 1.6 - 8.3  10e9/L    Absolute Lymphocytes 0.5 (L) 0.8 - 5.3 10e9/L    Absolute Monocytes 0.5 0.0 - 1.3 10e9/L    Absolute Eosinophils 0.0 0.0 - 0.7 10e9/L    Absolute Basophils 0.0 0.0 - 0.2 10e9/L    Abs Immature Granulocytes 0.0 0 - 0.4 10e9/L    Absolute Nucleated RBC 0.0    Comprehensive metabolic panel   Result Value Ref Range    Sodium 140 133 - 144 mmol/L    Potassium 4.1 3.4 - 5.3 mmol/L    Chloride 103 94 - 109 mmol/L    Carbon Dioxide 26 20 - 32 mmol/L    Anion Gap 11 3 - 14 mmol/L    Glucose 119 (H) 70 - 99 mg/dL    Urea Nitrogen 30 7 - 30 mg/dL    Creatinine 1.06 0.66 - 1.25 mg/dL    GFR Estimate 66 >60 mL/min/1.7m2    GFR Estimate If Black 79 >60 mL/min/1.7m2    Calcium 9.3 8.5 - 10.1 mg/dL    Bilirubin Total 1.1 0.2 - 1.3 mg/dL    Albumin 3.6 3.4 - 5.0 g/dL    Protein Total 7.3 6.8 - 8.8 g/dL    Alkaline Phosphatase 81 40 - 150 U/L    ALT 53 0 - 70 U/L    AST 55 (H) 0 - 45 U/L   CT Head w/o Contrast    Narrative    PROCEDURE: CT HEAD W/O CONTRAST     HISTORY: fall with right mastoid swelling; .    COMPARISON: June 13, 2018    TECHNIQUE:  Helical images of the head from the foramen magnum to the  vertex were obtained without contrast.    FINDINGS: There is no acute brain abnormality. Extensive small vessel  changes are seen in both hemispheres is a small lacunar infarcts in  the basal ganglion on the right which appears old. There are no masses  or ventricular shifts. The brainstem and cerebellum appear normal. The  cranial vault is intact. No abnormal fluid is seen in the middle ear  cavity or mastoids.  The visualized paranasal sinuses are clear.      Impression    IMPRESSION: No acute brain abnormality.      BRETT DALY MD   UA reflex to Microscopic   Result Value Ref Range    Color Urine Yellow     Appearance Urine Clear     Glucose Urine Negative NEG^Negative mg/dL    Bilirubin Urine Negative NEG^Negative    Ketones Urine 10 (A) NEG^Negative mg/dL    Specific Gravity Urine 1.014 1.003 - 1.035    Blood  Urine Trace (A) NEG^Negative    pH Urine 5.5 4.7 - 8.0 pH    Protein Albumin Urine 30 (A) NEG^Negative mg/dL    Urobilinogen mg/dL Normal 0.0 - 2.0 mg/dL    Nitrite Urine Negative NEG^Negative    Leukocyte Esterase Urine Negative NEG^Negative    Source Midstream Urine     RBC Urine 2 0 - 2 /HPF    WBC Urine 1 0 - 5 /HPF    Bacteria Urine None (A) NEG^Negative /HPF    Mucous Urine Present (A) NEG^Negative /LPF       Medications   0.9% sodium chloride BOLUS (1,000 mLs Intravenous New Bag 7/25/18 4955)     Followed by   sodium chloride 0.9% infusion (not administered)       Assessments & Plan (with Medical Decision Making)   Workup as above including CT scan of the head.  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  He is currently on Plavix.  Danyn offers absolutely no questions or concerns for me.  I can find no reasonable or compelling medical indication for further workup or observation at this point.  He appears to be falling more recently without any focal injury other than the right-sided head hematoma.  Needs close follow-up with primary care with likely transition to nursing home at some point.  He is adamant on discharge and I believe he has the capacity to make his own medical decisions.  Return here for any other questions or concerns.    (Please note that this note was completed with a voice recognition program.  Every attempt was made to edit the dictations, but inevitably there remain words that are mis-transcribed.)    I have reviewed the nursing notes.    I have reviewed the findings, diagnosis, plan and need for follow up with the patient.       New Prescriptions    No medications on file       Final diagnoses:   Falls frequently       7/25/2018   [facility]     Mac Atkins PA-C  07/25/18 1652

## 2018-07-25 NOTE — ED NOTES
Patient verbalizes understanding of discharge instructions. Denies pain. Home care referral done. Follow up appt set up for pt.   [facility] transporting pt home.

## 2018-07-25 NOTE — PROGRESS NOTES
Orders sent for patient to be seen by [facility] for a plan for continued care.  I did call to get an appointment for him to see his primary care provider.  He and I spoke and I did share with the patient that he will have his home care nurse coming back into his home.  I did also share that Bridget and I had spoken.  He noted to me that Bridget was in [place].    I sent the orders to [facility].  I spoke with Juliana there.  He does have a ride with [facility] to take him back to his apartment.    I spoke with Bridget to let her know he would be going back home.   Will provide an update to Noemi at [facility] as she was the person who called this patient in to have him brought in.

## 2018-07-25 NOTE — ED AVS SNAPSHOT
[facility]    [address] [address]    Phone:  [phone]                                       Danny Muniz   MRN: [id number]    Department:  [facility]   Date of Visit:  7/25/2018           Patient Information     Date Of Birth          1/27/1928        Your diagnoses for this visit were:     Falls frequently        You were seen by Mac Atkins PA-C.      Follow-up Information     Schedule an appointment as soon as possible for a visit with Erick Estevez MD.    Specialty:  Family Practice    Contact information:    [facility]  [address] [facility] [address] [phone]          Follow up with [facility].    Specialty:  EMERGENCY MEDICINE    Why:  If symptoms worsen    Contact information:    [address] [address] [phone]    Additional information:    From [place]: Take [address]. Turn left at [address]. Turn left at the first stoplight on [address]. At the first stop sign, take a right onto [address]. Take a left into the parking lot and continue through until you reach the North enterance of the building.       From [place]: Take [address]. Take the [address] ramp towards [place]. Turn left onto [address]. Take a slight right onto [address]. Turn left at the first stoplight on [address]. At the first stop sign, take a right onto [address]. Take a left into the parking lot and continue through until you reach the North enterance of the building.       From [place]: Take [address]. Take a right at [address]. At the first stop sign, take a right onto [address]. Take a left into the parking lot and continue through until you reach the North enterance of the building.         Discharge Instructions       Please follow-up with Dr. Estevez.   August 7 Tuesday   3:15     Stay hydrated.     Return  here for any other concerns.     [facility] will call to come back in and see you    ED Discharge Orders     HOME CARE NURSING REFERRAL       **Order classes of: [facility], [facility] and [facility] will route to the Home Care and Hospice Referral Pool.  Home Care or Hospice will then contact the patient to schedule their appointment.**    If you do not hear from Home Care and Hospice, or you would like to call to schedule, please call the referring place of service that your provider has listed below.  ______________________________________________________________________    Your provider has referred you to: [facility] - [place] [phone]   [email or web address]    Extended Emergency Contact Information  Primary Emergency Contact: PATRICE PORTER   [facility]  Home Phone: [phone]  Relation: Relative    Patient Anticipated Discharge Date: 7/25/18   RN, PT, HHA to begin 24 - 48 hours after discharge.  PLEASE EVALUATE AND TREAT (Evaluation timeline is 24 - 48 hrs. Please call if there is need for a variance to this timeline).    REASON FOR REFERRAL: Assessment & Treatment: PT and RN    ADDITIONAL SERVICES NEEDED: OT    OTHER PERTINENT INFORMATION: Patient was last seen by provider on 7/25/18 for frequent falls.    Current Outpatient Prescriptions:  allopurinol (ZYLOPRIM) 100 MG tablet, TAKE 1 TABLET BY MOUTH ONCE DAILY, Disp: , Rfl:   amLODIPine (NORVASC) 5 MG tablet, Take 1 tablet (5 mg) by mouth daily for 14 days, Disp: 14 tablet, Rfl: 0  atorvastatin (LIPITOR) 40 MG tablet, Take 40 mg by mouth, Disp: , Rfl:   clopidogrel (PLAVIX) 75 MG tablet, Take 75 mg by mouth, Disp: , Rfl:   Cyanocobalamin (VITAMIN B-12 CR) 1000 MCG TBCR, One daily, Disp: , Rfl:   finasteride (PROSCAR) 5 MG tablet, Take 5 mg by mouth, Disp: , Rfl:   terazosin (HYTRIN) 5 MG capsule, TAKE 1 CAPSULE BY MOUTH AT BEDTIME, Disp: , Rfl:       There is no problem list on  file for this patient.      Documentation of Face to Face and Certification for Home Health Services    I certify that patient, Danny Muniz is under my care and that I, or a Nurse Practitioner or Physician's Assistant working with me, had a face-to-face encounter that meets the physician face-to-face encounter requirements with this patient on: 7/25/2018.    This encounter with the patient was in whole, or in part, for the following medical condition, which is the primary reason for Home Health Care: frequent falls.    I certify that, based on my findings, the following services are medically necessary Home Health Services: Nursing, Occupational Therapy and Physical Therapy    My clinical findings support the need for the above services because: Nurse is needed: medications., Occupational Therapy Services are needed to assess and treat cognitive ability and address ADL safety due to impairment in falls. and Physical Therapy Services are needed to assess and treat the following functional impairments: falls.    Further, I certify that my clinical findings support that this patient is homebound (i.e. absences from home require considerable and taxing effort and are for medical reasons or Sh[employer] services or infrequently or of short duration when for other reasons) because: Leaving home is medically contraindicated for the following reason(s): multiple falls.    Based on the above findings, I certify that this patient is confined to the home and needs intermittent skilled nursing care, physical therapy and/or speech therapy.  The patient is under my care, and I have initiated the establishment of the plan of care.  This patient will be followed by a physician who will periodically review the plan of care.    Physician/Provider to provide follow up care: Erick Estevez certified Physician at time of discharge: Herb     Please be aware that coverage of these services is subject to the  terms and limitations of your health insurance plan.  Call member services at your health plan with any benefit or coverage questions.                     Review of your medicines      Our records show that you are taking the medicines listed below. If these are incorrect, please call your family doctor or clinic.        Dose / Directions Last dose taken    allopurinol 100 MG tablet   Commonly known as:  ZYLOPRIM        TAKE 1 TABLET BY MOUTH ONCE DAILY   Refills:  0        amLODIPine 5 MG tablet   Commonly known as:  NORVASC   Dose:  5 mg   Quantity:  14 tablet        Take 1 tablet (5 mg) by mouth daily for 14 days   Refills:  0        atorvastatin 40 MG tablet   Commonly known as:  LIPITOR   Dose:  40 mg        Take 40 mg by mouth   Refills:  0        clopidogrel 75 MG tablet   Commonly known as:  PLAVIX   Dose:  75 mg        Take 75 mg by mouth   Refills:  0        finasteride 5 MG tablet   Commonly known as:  PROSCAR   Dose:  5 mg        Take 5 mg by mouth   Refills:  0        terazosin 5 MG capsule   Commonly known as:  HYTRIN        TAKE 1 CAPSULE BY MOUTH AT BEDTIME   Refills:  0        Vitamin B-12 CR 1000 MCG Tbcr        One daily   Refills:  0                Procedures and tests performed during your visit     CBC with platelets differential    CT Head w/o Contrast    Comprehensive metabolic panel    Peripheral IV catheter    UA reflex to Microscopic      Orders Needing Specimen Collection     None      Pending Results     No orders found from 7/23/2018 to 7/26/2018.            Pending Culture Results     No orders found from 7/23/2018 to 7/26/2018.            Thank you for choosing [facility]       Thank you for choosing [facility] for your care. Our goal is always to provide you with excellent care. Hearing back from our patients is one way we can continue to improve our services. Please take a few minutes to complete the written survey that you may receive in the mail after you visit with us. Thank you!         Care EveryWhere ID     This is your Care EveryWhere ID. This could be used by other organizations to access your [facility] medical records  [id number]        Equal Access to Services     CHEIKH HOYT : Kenrick Lerma, andrez hayes, meseret shankar, ismael arzate. So [facility] [phone].    ATENCIÓN: Si habla español, tiene a rodriguez disposición servicios gratuitos de asistencia lingüística. Llame al [phone].    We comply with applicable federal civil rights laws and [place] laws. We do not discriminate on the basis of race, color, national origin, age, disability, sex, sexual orientation, or gender identity.            After Visit Summary       This is your record. Keep this with you and show to your community pharmacist(s) and doctor(s) at your next visit.

## 2018-09-15 NOTE — ED AVS SNAPSHOT
[facility]    [address]    [place] [address]    Phone:  [phone]                                       Danny Muniz   MRN: [id number]    Department:  [facility]   Date of Visit:  9/15/2018           After Visit Summary Signature Page     I have received my discharge instructions, and my questions have been answered. I have discussed any challenges I see with this plan with the nurse or doctor.    ..........................................................................................................................................  Patient/Patient Representative Signature      ..........................................................................................................................................  Patient Representative Print Name and Relationship to Patient    ..................................................               ................................................  Date                                   Time    ..........................................................................................................................................  Reviewed by Signature/Title    ...................................................              ..............................................  Date                                               Time          22EPIC Rev 08/18

## 2018-09-15 NOTE — ED PROVIDER NOTES
"eMERGENCY dEPARTMENT eNCOUnter      CHIEF COMPLAINT    Chief Complaint   Patient presents with     Urinary Retention     x 2 days       HPI    Danny Muniz is a 89 year old male who presents with complaints of urinary leaking   for \"quite some time\" his original complaint was urinary retention however, bladder scan showed 200 ml, and he felt like he could not urinate but when I talked to him he provides a different story.  He states he is never seen a urologist.  He lives alone in an apartment in town.  When I asked him how he is feeling he says okay but he has not had anything to eat today.  He states he \"did not feel like it\"     REVIEW OF SYSTEMS    Musculoskeletal: No midline back pain, or extremity injury  : no dysuria, no hematuria  GI: No abdominal pain or diarrhea  General: No fevers or chills  Neurologic: No bowel or bladder incontinence, No saddle anesthesia, No leg weakness  All other systems reviewed and are negative.     PAST MEDICAL & SURGICAL HISTORY    History reviewed. No pertinent past medical history.  History reviewed. No pertinent surgical history.    CURRENT MEDICATIONS    Current Outpatient Rx   Medication Sig Dispense Refill     amLODIPine (NORVASC) 5 MG tablet Take 1 tablet (5 mg) by mouth daily for 14 days 14 tablet 0     atorvastatin (LIPITOR) 40 MG tablet Take 40 mg by mouth       clopidogrel (PLAVIX) 75 MG tablet Take 75 mg by mouth       Cyanocobalamin (VITAMIN B-12 CR) 1000 MCG TBCR One daily       finasteride (PROSCAR) 5 MG tablet Take 5 mg by mouth       terazosin (HYTRIN) 5 MG capsule TAKE 1 CAPSULE BY MOUTH AT BEDTIME         ALLERGIES    No Known Allergies    SOCIAL HISTORY    Social History     Social History     Marital status: Single     Spouse name: N/A     Number of children: N/A     Years of education: N/A     Social History Main Topics     Smoking status: Former Smoker     Smokeless tobacco: Never Used     Alcohol use No     Drug use: No     Sexual activity: Not Asked "     Other Topics Concern     None     Social History Narrative     None       PHYSICAL EXAM    VITAL SIGNS: /82  Temp 97.8  F (36.6  C) (Tympanic)  Resp 20  SpO2 97%   Constitutional:  Well developed, well nourished  HENT:  Atraumatic,  Moist mucus membranes  Neck: No JVD, supple   Respiratory:  No respiratory distress, normal breath sounds  Cardiovascular:  regular rate,  no murmurs   GI:  Soft, no tenderness, no pulsatile masses  Musculoskeletal:  No edema, no acute deformities.  Back: no soft tissue swelling, +no flank tenderness   Integument:  Skin is warm and dry   Neurologic: Normal Extremity Motor, awake, alert, and oriented        ED COURSE & MEDICAL DECISION MAKING    Pertinent Labs & Imaging studies interpreted and reviewed. (See chart for details)  See chart for details of medications given during the ED stay.    Vitals:    09/15/18 1543 09/15/18 1720 09/15/18 1815   BP: 139/74 169/82 159/82   Resp: 20     Temp: 97.8  F (36.6  C)     TempSrc: Tympanic     SpO2: 98% 96% 97%         FINAL IMPRESSION    1. Post-void dribbling        PLAN  He has mild urinary retention and some post-void dribbling.  He is able to urinate so is not completely obstructed.     I think he would also benefit from some home care, he took the taxi here.  Had some issues with falling earlier this summer, he has not seen Dr. Estevez for 4 months, he probably needs closer monitoring. Referral to urology        (Please note that this note was completed with a voice recognition program.  Every attempt was made to edit the dictations, but inevitably there remain words that are mis-transcribed.)         Caitlyn Gonzales MD  09/15/18 2000

## 2018-09-15 NOTE — ED AVS SNAPSHOT
[facility]    [address]    [place] [address]    Phone:  [phone]                                       Danny Muniz   MRN: [id number]    Department:  [facility]   Date of Visit:  9/15/2018           Patient Information     Date Of Birth          1/27/1928        Your diagnoses for this visit were:     Post-void dribbling        You were seen by Caitlyn Gonzales MD.        Discharge Instructions       Mr. Muniz,    I have made a referral to a urologist.  They will call you Monday with appointment time.    ED Discharge Orders     UROLOGY ADULT REFERRAL       Your provider has referred you to: Critical access hospital - [phone]  [email or web address]    Please be aware that coverage of these services is subject to the terms and limitations of your health insurance plan.  Call member services at your health plan with any benefit or coverage questions.      Please bring the following with you to your appointment:    (1) Any X-Rays, CTs or MRIs which have been performed.  Contact the facility where they were done to arrange for  prior to your scheduled appointment.    (2) List of current medications  (3) This referral request   (4) Any documents/labs given to you for this referral                     Review of your medicines      Our records show that you are taking the medicines listed below. If these are incorrect, please call your family doctor or clinic.        Dose / Directions Last dose taken    amLODIPine 5 MG tablet   Commonly known as:  NORVASC   Dose:  5 mg   Quantity:  14 tablet        Take 1 tablet (5 mg) by mouth daily for 14 days   Refills:  0        atorvastatin 40 MG tablet   Commonly known as:  LIPITOR   Dose:  40 mg        Take 40 mg by mouth   Refills:  0        clopidogrel 75 MG tablet   Commonly known as:  PLAVIX   Dose:  75 mg        Take 75 mg by mouth   Refills:  0        finasteride 5 MG tablet  "  Commonly known as:  PROSCAR   Dose:  5 mg        Take 5 mg by mouth   Refills:  0        terazosin 5 MG capsule   Commonly known as:  HYTRIN        TAKE 1 CAPSULE BY MOUTH AT BEDTIME   Refills:  0        Vitamin B-12 CR 1000 MCG Tbcr        One daily   Refills:  0                Procedures and tests performed during your visit     CBC with platelets differential    Comprehensive metabolic panel    UA with Microscopic      Orders Needing Specimen Collection     None      Pending Results     No orders found from 2018 to 2018.            Pending Culture Results     No orders found from 2018 to 2018.            Thank you for choosing [facility]       Thank you for choosing [facility] for your care. Our goal is always to provide you with excellent care. Hearing back from our patients is one way we can continue to improve our services. Please take a few minutes to complete the written survey that you may receive in the mail after you visit with us. Thank you!        [employer] Information     [employer] lets you send messages to your doctor, view your test results, renew your prescriptions, schedule appointments and more. To sign up, go to [email or web address] . Click on \"Log in\" on the left side of the screen, which will take you to the Welcome page. Then click on \"Sign up Now\" on the right side of the page.     You will be asked to enter the access code listed below, as well as some personal information. Please follow the directions to create your username and password.     Your access code is: [email or web address]  Expires: 2018  7:39 PM     Your access code will  in 90 days. If you need help or a new code, please call your [facility] clinic or [phone].        Care EveryWhere ID     This is your Care EveryWhere ID. This could be used by other organizations to access your [facility] medical records  [id number]        Equal Access to Services     CHEIKH FRIEND: andrez Rodríguez " meseret hayes waxay idiin hayaan adeeg kharash la'aan ah. So [facility] [phone].    ATENCIÓN: Si habla giorgi, tiene a rodriguez disposición servicios gratuitos de asistencia lingüística. Llame al [phone].    We comply with applicable federal civil rights laws and [place] laws. We do not discriminate on the basis of race, color, national origin, age, disability, sex, sexual orientation, or gender identity.            After Visit Summary       This is your record. Keep this with you and show to your community pharmacist(s) and doctor(s) at your next visit.

## 2018-09-15 NOTE — ED NOTES
Patient presents to the emergency room via taxi.  Per patient he has not been able to void x 2 days.  Patient to room.  Patient able to stand with assistance-unsteady on feet.  Patient smells of old urine.  Patient's clothing removed, gown on.  Bedside bladder scan to room and in progress.

## 2018-09-16 NOTE — ED NOTES
Pt dressed and brought back to [facility] by healthline transportation. Marlys at [facility] suites notified of pts return.

## 2018-09-16 NOTE — DISCHARGE INSTRUCTIONS
Mr. Muniz,    I have made a referral to a urologist.  They will call you Monday with appointment time.

## 2018-09-18 NOTE — ED AVS SNAPSHOT
[facility]    [address] [address]    Phone:  [phone]                                       Danny Muniz   MRN: [id number]    Department:  [facility]   Date of Visit:  9/18/2018           Patient Information     Date Of Birth          1/27/1928        Your diagnoses for this visit were:     Herpes zoster without complication     Spastic bladder        You were seen by Dez Bravo MD.      Follow-up Information     Follow up with Erick Estevez MD.    Specialty:  Family Practice    Contact information:    [address] [address]  [phone]          Follow up with Erick Estevez MD.    Specialty:  Family Practice    Contact information:    [address] [address]  [phone]          Follow up with Erick Estevez MD.    Specialty:  Family Practice    Why:  As needed    Contact information:    [address] [address]  [phone]          Discharge Instructions       Danny and caregivers,    There was no signs of infection in Danny's bladder, but his symptoms suggest bladder spasm, so a 1 month course of ditropan (oxybutynin) will be given to see if any improvement.  Hopefully, Danny's FP Herb can get him referred to a urologist.  Good luck!       Review of your medicines      START taking        Dose / Directions Last dose taken    oxybutynin 5 MG 24 hr tablet   Commonly known as:  DITROPAN XL   Dose:  5 mg   Quantity:  30 tablet        Take 1 tablet (5 mg) by mouth daily   Refills:  3          Our records show that you are taking the medicines listed below. If these are incorrect, please call your family doctor or clinic.        Dose / Directions Last dose taken    amLODIPine 5 MG tablet   Commonly known as:  NORVASC   Dose:  5 mg   Quantity:  14 tablet        Take 1 tablet (5 mg) by mouth daily for 14 days   Refills:  0        atorvastatin 40 MG tablet    Commonly known as:  LIPITOR   Dose:  40 mg        Take 40 mg by mouth   Refills:  0        clopidogrel 75 MG tablet   Commonly known as:  PLAVIX   Dose:  75 mg        Take 75 mg by mouth   Refills:  0        finasteride 5 MG tablet   Commonly known as:  PROSCAR   Dose:  5 mg        Take 5 mg by mouth   Refills:  0        terazosin 5 MG capsule   Commonly known as:  HYTRIN        TAKE 1 CAPSULE BY MOUTH AT BEDTIME   Refills:  0        Vitamin B-12 CR 1000 MCG Tbcr        One daily   Refills:  0                Prescriptions were sent or printed at these locations (1 Prescription)                   [id number] - [place], [place] - [address]   [address] [place] [address]    Telephone:  [phone]   Fax:  [phone]   Hours:                  Printed at Department/Unit printer (1 of 1)         oxybutynin (DITROPAN XL) 5 MG 24 hr tablet                Procedures and tests performed during your visit     Procedure/Test Number of Times Performed    Bladder scan 1    Blood culture 2    CBC with platelets differential 1    CRP inflammation 1    Comprehensive metabolic panel 1    Erythrocyte sedimentation rate auto 1    INR 1    Magnesium 1    Peripheral IV catheter 1    UA with Microscopic reflex to Culture 1    XR Chest Port 1 View 1      Orders Needing Specimen Collection     Ordered          09/18/18 1915  UA with Microscopic reflex to Culture - STAT, Prio: STAT, Final result     Scheduled Task Status   09/18/18 1914 E[employer] Conversion CC Reminder: Open   Order Class:  PCU Collect                  Pending Results     Date and Time Order Name Status Description    9/18/2018 1919 INR In process     9/18/2018 1919 Blood culture In process     9/18/2018 1919 Blood culture In process             Pending Culture Results     Date and Time Order Name Status Description    9/18/2018 1919 Blood culture In process     9/18/2018 1919 Blood culture In process             Thank you for choosing [facility]       Thank you  "for choosing [facility] for your care. Our goal is always to provide you with excellent care. Hearing back from our patients is one way we can continue to improve our services. Please take a few minutes to complete the written survey that you may receive in the mail after you visit with us. Thank you!        "[employer] Information     [employer] lets you send messages to your doctor, view your test results, renew your prescriptions, schedule appointments and more. To sign up, go to [email or web address] . Click on \"Log in\" on the left side of the screen, which will take you to the Welcome page. Then click on \"Sign up Now\" on the right side of the page.     You will be asked to enter the access code listed below, as well as some personal information. Please follow the directions to create your username and password.     Your access code is: [email or web address]  Expires: 2018  7:39 PM     Your access code will  in 90 days. If you need help or a new code, please call your [facility] clinic or [phone].        Care EveryWhere ID     This is your Care EveryWhere ID. This could be used by other organizations to access your [facility] medical records  [id number]        Equal Access to Services     CHEIKH HOYT : Kenrick Lerma, wasri hayes, qatoro kaalsilvia shankar, ismael arzate. So [facility] [phone].    ATENCIÓN: Si habla español, tiene a rodriguez disposición servicios gratuitos de asistencia lingüística. Llame al [phone].    We comply with applicable federal civil rights laws and [place] laws. We do not discriminate on the basis of race, color, national origin, age, disability, sex, sexual orientation, or gender identity.            After Visit Summary       This is your record. Keep this with you and show to your community pharmacist(s) and doctor(s) at your next visit.                  "

## 2018-09-18 NOTE — ED AVS SNAPSHOT
[facility]    [address]    [place] [address]    Phone:  [phone]                                       Danny Muniz   MRN: [id number]    Department:  [facility]   Date of Visit:  9/18/2018           After Visit Summary Signature Page     I have received my discharge instructions, and my questions have been answered. I have discussed any challenges I see with this plan with the nurse or doctor.    ..........................................................................................................................................  Patient/Patient Representative Signature      ..........................................................................................................................................  Patient Representative Print Name and Relationship to Patient    ..................................................               ................................................  Date                                   Time    ..........................................................................................................................................  Reviewed by Signature/Title    ...................................................              ..............................................  Date                                               Time          22EPIC Rev 08/18

## 2018-09-19 NOTE — ED NOTES
[facility] transportation here to bring patient back to [facility]. Assisted into wheelchair with SBA of 1. Tolerated well. Left ED in care of [facility] .

## 2018-09-19 NOTE — DISCHARGE INSTRUCTIONS
Danny and caregivers,    There was no signs of infection in Danny's bladder, but his symptoms suggest bladder spasm, so a 1 month course of ditropan (oxybutynin) will be given to see if any improvement.  Hopefully, Danny's FP Herb can get him referred to a urologist.  Good luck!

## 2018-09-19 NOTE — ED NOTES
Discharge instructions given. Verbalized understanding. Denies pain. Awaiting [facility] to transport patient back to [facility].

## 2018-09-19 NOTE — ED PROVIDER NOTES
History     Chief Complaint   Patient presents with     Generalized Weakness     and decreased appetite X 2 weeks     Rule out Urinary Tract Infection     Fever 101 at NH with fowl smelling urine     Rash     Left flank, concern for shingles     HPI  Danny Muniz is a 89 year old male with above hx.  He has BPH treated with proscar 5mg and hytrin 5mg daily and claims for the last 2 yrs to have a lot of dribbling.  He developed a shingle type rash almost 10 days ago but has tolerated it reasonably well.  Denies fever, rigors or chills although he volunteers his memory is not as good as it could be.  His medical problems include Cerebral small vessel disease evaluated for CVA but neuro consultant not convinced 6 weeks ago at [facility], HTN, gout, CKD3, and OA.      Problem List:    There are no active problems to display for this patient.       Past Medical History:    No past medical history on file.    Past Surgical History:    No past surgical history on file.    Family History:    No family history on file.    Social History:  Marital Status:  Single [1]  Social History   Substance Use Topics     Smoking status: Former Smoker     Smokeless tobacco: Never Used     Alcohol use No        Medications:      amLODIPine (NORVASC) 5 MG tablet   atorvastatin (LIPITOR) 40 MG tablet   clopidogrel (PLAVIX) 75 MG tablet   Cyanocobalamin (VITAMIN B-12 CR) 1000 MCG TBCR   finasteride (PROSCAR) 5 MG tablet   oxybutynin (DITROPAN XL) 5 MG 24 hr tablet   terazosin (HYTRIN) 5 MG capsule         Review of Systems   Genitourinary: Positive for decreased urine volume, difficulty urinating and urgency. Negative for flank pain.   Musculoskeletal: Positive for back pain and myalgias.   Neurological: Positive for weakness.   All other systems reviewed and are negative.      Physical Exam   BP: 154/92  Pulse: 74  Temp: 98.5  F (36.9  C)  Resp: 16  SpO2: 96 %      Physical Exam   Constitutional: He is oriented to person, place, and time.  He appears well-developed and well-nourished. No distress.   Cooperative pleasant fellow with mild cognitive dullness and recall however generally appropriate/social.    HENT:   Head: Normocephalic and atraumatic.   Right Ear: External ear normal.   Left Ear: External ear normal.   Nose: Nose normal.   Mouth/Throat: Oropharynx is clear and moist.   Eyes: Conjunctivae and EOM are normal. Pupils are equal, round, and reactive to light. Right eye exhibits no discharge. Left eye exhibits no discharge.   Neck: Normal range of motion. Neck supple. No JVD present. No tracheal deviation present. No thyromegaly present.   Cardiovascular: Normal rate, regular rhythm and normal heart sounds.    Pulmonary/Chest: Effort normal and breath sounds normal. No respiratory distress. He has no wheezes. He has no rales.   Abdominal: Soft. Bowel sounds are normal.   Confluent well demarcated left T 10 maturing shingles with a few satellite lesion in left femoral triangle medially   Genitourinary: Rectum normal, prostate normal and penis normal.   Genitourinary Comments: Rectal shows no masses, soft prostate nontender and no fluctuance   Musculoskeletal: Normal range of motion. He exhibits no edema or deformity.   Neurological: He is alert and oriented to person, place, and time.   Skin: Skin is warm. Rash noted. He is not diaphoretic.   T4 zoster rash left abdomen and flank to midline both front and back.      ED Course     ED Course     Procedures               Critical Care time:  none               Results for orders placed or performed during the hospital encounter of 09/18/18 (from the past 24 hour(s))   CBC with platelets differential   Result Value Ref Range    WBC 5.5 4.0 - 11.0 10e9/L    RBC Count 4.16 (L) 4.4 - 5.9 10e12/L    Hemoglobin 13.0 (L) 13.3 - 17.7 g/dL    Hematocrit 36.6 (L) 40.0 - 53.0 %    MCV 88 78 - 100 fl    MCH 31.3 26.5 - 33.0 pg    MCHC 35.5 31.5 - 36.5 g/dL    RDW 13.5 10.0 - 15.0 %    Platelet Count 164 150  - 450 10e9/L    Diff Method Automated Method     % Neutrophils 63.6 %    % Lymphocytes 17.6 %    % Monocytes 17.9 %    % Eosinophils 0.2 %    % Basophils 0.5 %    % Immature Granulocytes 0.2 %    Nucleated RBCs 0 0 /100    Absolute Neutrophil 3.5 1.6 - 8.3 10e9/L    Absolute Lymphocytes 1.0 0.8 - 5.3 10e9/L    Absolute Monocytes 1.0 0.0 - 1.3 10e9/L    Absolute Eosinophils 0.0 0.0 - 0.7 10e9/L    Absolute Basophils 0.0 0.0 - 0.2 10e9/L    Abs Immature Granulocytes 0.0 0 - 0.4 10e9/L    Absolute Nucleated RBC 0.0    Comprehensive metabolic panel   Result Value Ref Range    Sodium 127 (L) 133 - 144 mmol/L    Potassium 3.8 3.4 - 5.3 mmol/L    Chloride 94 94 - 109 mmol/L    Carbon Dioxide 27 20 - 32 mmol/L    Anion Gap 6 3 - 14 mmol/L    Glucose 105 (H) 70 - 99 mg/dL    Urea Nitrogen 28 7 - 30 mg/dL    Creatinine 1.00 0.66 - 1.25 mg/dL    GFR Estimate 70 >60 mL/min/1.7m2    GFR Estimate If Black 85 >60 mL/min/1.7m2    Calcium 8.4 (L) 8.5 - 10.1 mg/dL    Bilirubin Total 0.8 0.2 - 1.3 mg/dL    Albumin 3.4 3.4 - 5.0 g/dL    Protein Total 7.0 6.8 - 8.8 g/dL    Alkaline Phosphatase 98 40 - 150 U/L    ALT 49 0 - 70 U/L     (H) 0 - 45 U/L   CRP inflammation   Result Value Ref Range    CRP Inflammation 11.3 (H) 0.0 - 8.0 mg/L   Erythrocyte sedimentation rate auto   Result Value Ref Range    Sed Rate 17 0 - 20 mm/h   INR   Result Value Ref Range    INR 1.08 0.80 - 1.20   Magnesium   Result Value Ref Range    Magnesium 2.1 1.6 - 2.3 mg/dL   UA with Microscopic reflex to Culture   Result Value Ref Range    Color Urine Yellow     Appearance Urine Clear     Glucose Urine Negative NEG^Negative mg/dL    Bilirubin Urine Negative NEG^Negative    Ketones Urine Negative NEG^Negative mg/dL    Specific Gravity Urine 1.009 1.003 - 1.035    Blood Urine Small (A) NEG^Negative    pH Urine 5.5 4.7 - 8.0 pH    Protein Albumin Urine 30 (A) NEG^Negative mg/dL    Urobilinogen mg/dL Normal 0.0 - 2.0 mg/dL    Nitrite Urine Negative NEG^Negative     Leukocyte Esterase Urine Negative NEG^Negative    Source Midstream Urine     WBC Urine 1 0 - 5 /HPF    RBC Urine 1 0 - 2 /HPF    Bacteria Urine None (A) NEG^Negative /HPF    Mucous Urine Present (A) NEG^Negative /LPF   XR Chest Port 1 View    Narrative    PROCEDURE:  XR CHEST PORT 1 VW    HISTORY: fever  chills sob rule out pneumonia; . .    COMPARISON:  6/13/2018    FINDINGS:    The cardiomediastinal contours are stable. There is calcific aortic  atherosclerosis.   There is chronic coarsening of the interstitial markings. No definite  new consolidation is seen. No effusion or pneumothorax is present.      Impression    IMPRESSION:  No definite consolidation. Consider follow-up PA and  lateral views.      DERRICK PEDROZA MD     Medications   lidocaine 1 % 1 mL (not administered)   lidocaine (LMX4) kit (not administered)   sodium chloride (PF) 0.9% PF flush 3 mL (not administered)   sodium chloride (PF) 0.9% PF flush 3 mL (not administered)   lidocaine 2 % (URO-JET) jelly 20 mL (not administered)     Assessments & Plan (with Medical Decision Making)   Danny has prominent left T10 zoster that seems to not really be bothering him.  IV placed and labs obtained revealing Hgb 13, Na 127, normal UA with scan RBCs.  Blood x2 and urine cultures obtained with nondiagnostic CXR.  No evidence that he has significant UTI or bacteremia but certainly has irritable bladder so will give 1 month trial of ditropan that his FP Erick VANEGAS can evaluate for side effects and overall bladder efficacy.  Return to [facility].   I have reviewed the nursing notes.    I have reviewed the findings, diagnosis, plan and need for follow up with the patient.       Discharge Medication List as of 9/18/2018  8:34 PM      START taking these medications    Details   oxybutynin (DITROPAN XL) 5 MG 24 hr tablet Take 1 tablet (5 mg) by mouth daily, Disp-30 tablet, R-3, Local Print             Final diagnoses:   Herpes zoster without complication    Spastic bladder       9/18/2018   [facility]     Dez Bravo MD  09/18/18 8937

## 2018-09-19 NOTE — ED NOTES
Face to face report given with opportunity to observe patient.  Report given to WERNER Trejo.    GERMAN TAM  9/18/2018, 7:08 PM

## 2018-09-25 NOTE — ED AVS SNAPSHOT
[facility]    [address]    [place] [address]    Phone:  [phone]                                       Danny Muniz   MRN: [id number]    Department[facility]  [facility]   Date of Visit:  9/25/2018           After Visit Summary Signature Page     I have received my discharge instructions, and my questions have been answered. I have discussed any challenges I see with this plan with the nurse or doctor.    ..........................................................................................................................................  Patient/Patient Representative Signature      ..........................................................................................................................................  Patient Representative Print Name and Relationship to Patient    ..................................................               ................................................  Date                                   Time    ..........................................................................................................................................  Reviewed by Signature/Title    ...................................................              ..............................................  Date                                               Time          22EPIC Rev 08/18

## 2018-09-25 NOTE — ED AVS SNAPSHOT
[facility]    [address] [place] [address]    JEMAL SHEPPARD [address]    Phone:  [phone]                                       Danny Muniz   MRN: [id number]    Department:  [facility]   Date of Visit:  9/25/2018           Patient Information     Date Of Birth          1/27/1928        Your diagnoses for this visit were:     Functional urinary incontinence        You were seen by Kristy Ahuja MD.      Follow-up Information     Call Isaias Antonio MD.    Specialty:  Urology    Contact information:    [address] [place] [address] [phone]          Discharge Instructions         Urinary Incontinence (Male)    Urinary incontinence means not being able to control the release of urine from the bladder.  Causes  Common causes of urinary incontinence in men include:    Infection    Certain medicines    Aging    Poor pelvic muscle tone    Bladder spasms    Obesity    Urinary retention  Nervous system diseases, diabetes, sleep apnea, urinary tract infections, prostate surgery, and pelvic trauma can also cause incontinence. Constipation and smoking have also been identified as risk factors.  Symptoms    Urge incontinence (also called  overactive bladder ) is a sudden urge to urinate even though there may not be much urine in the bladder. The need to urinate often during the night is common. It is due to bladder spasms.    Stress incontinence is involuntary urine leakage that can occur with sneezing, coughing, and other actions that put stress on the bladder.  Treatment  Treatment of urinary incontinence depends on the cause. Infections of the bladder are treated with antibiotics. Urinary retention is treated with a bladder catheter.  Home care  Follow these guidelines when caring for yourself at home:    Don't consume foods and drinks that may irritate the bladder. These include drinks containing alcohol, caffeinate, or carbonation; chocolate; and acidic fruits and  juices.    Limit fluid intake to 6 to 8 cups a day.    Lose weight if you are overweight. This will reduce your symptoms.    If needed, wear absorbent pads to catch urine. Change pads frequently to maintain hygiene and prevent skin and bladder infections.    Bathe daily to maintain good hygiene.    If an antibiotic was prescribed to treat a bladder infection, be sure to take it until finished, even if you are feeling better before then. This is to make sure your infection has cleared.    If a catheter was left in place, it is important to keep bacteria from getting into the collection bag. Don't disconnect the catheter from the collection bag.    Use a leg band to secure the catheter drainage tube, so it does not pull on the catheter. Drain the collection bag when it becomes full using the drain spout at the bottom of the bag. Don't disconnect the bag from the catheter.    Don't pull on or try to remove a catheter. The catheter must be removed by a healthcare provider.  Follow-up care  Follow up with your healthcare provider, or as advised.  When to seek medical advice  Call your healthcare provider right away if any of these occur:    Fever over 100.4 F (38 C), or as directed by your healthcare provider    Bladder pain or fullness    Abdominal swelling, nausea, or vomiting    Back pain    Weakness, dizziness, or fainting    If a catheter was left in place, return if:  ? Catheter falls out  ? Catheter stops draining for 6 hours  Date Last Reviewed: 10/1/2017    1607-8853 [employer]. [address] [place] [address], [place] [id number]. All rights reserved. This information is not intended as a substitute for professional medical care. Always follow your healthcare professional's instructions.             Review of your medicines      Our records show that you are taking the medicines listed below. If these are incorrect, please call your family doctor or clinic.        Dose / Directions Last dose taken     "amLODIPine 5 MG tablet   Commonly known as:  NORVASC   Dose:  5 mg   Quantity:  14 tablet        Take 1 tablet (5 mg) by mouth daily for 14 days   Refills:  0        atorvastatin 40 MG tablet   Commonly known as:  LIPITOR   Dose:  40 mg        Take 40 mg by mouth   Refills:  0        clopidogrel 75 MG tablet   Commonly known as:  PLAVIX   Dose:  75 mg        Take 75 mg by mouth   Refills:  0        finasteride 5 MG tablet   Commonly known as:  PROSCAR   Dose:  5 mg        Take 5 mg by mouth   Refills:  0        oxybutynin 5 MG 24 hr tablet   Commonly known as:  DITROPAN XL   Dose:  5 mg   Quantity:  30 tablet        Take 1 tablet (5 mg) by mouth daily   Refills:  3        terazosin 5 MG capsule   Commonly known as:  HYTRIN        TAKE 1 CAPSULE BY MOUTH AT BEDTIME   Refills:  0        Vitamin B-12 CR 1000 MCG Tbcr        One daily   Refills:  0                Procedures and tests performed during your visit     Basic metabolic panel    Bladder scan    CBC with platelets differential    CRP inflammation    Nt probnp inpatient    UA reflex to Microscopic      Orders Needing Specimen Collection     None      Pending Results     No orders found from 9/23/2018 to 9/26/2018.            Pending Culture Results     No orders found from 9/23/2018 to 9/26/2018.            Thank you for choosing [facility]       Thank you for choosing [facility] for your care. Our goal is always to provide you with excellent care. Hearing back from our patients is one way we can continue to improve our services. Please take a few minutes to complete the written survey that you may receive in the mail after you visit with us. Thank you!        [facility] Information     Personal lets you send messages to your doctor, view your test results, renew your prescriptions, schedule appointments and more. To sign up, go to [email or web address] . Click on \"Log in\" on the left side of the screen, which will take you to the Welcome page. Then click on \"Sign " "up Now\" on the right side of the page.     You will be asked to enter the access code listed below, as well as some personal information. Please follow the directions to create your username and password.     Your access code is: [email or web address]  Expires: 2018  7:39 PM     Your access code will  in 90 days. If you need help or a new code, please call your [facility] clinic or [phone].        Care EveryWhere ID     This is your Care EveryWhere ID. This could be used by other organizations to access your [facility] medical records  [id number]        Equal Access to Services     SANDRINE [facility] : Hadbita Lerma, andrez hayes, meseret shankar, ismael bryan . So [facility] [phone].    ATENCIÓN: Si habla español, tiene a rodriguez disposición servicios gratuitos de asistencia lingüística. Llame al [phone].    We comply with applicable federal civil rights laws and [place] laws. We do not discriminate on the basis of race, color, national origin, age, disability, sex, sexual orientation, or gender identity.            After Visit Summary       This is your record. Keep this with you and show to your community pharmacist(s) and doctor(s) at your next visit.                  "

## 2018-09-25 NOTE — ED PROVIDER NOTES
History     Chief Complaint   Patient presents with     Generalized Weakness     HPI  Danny Muniz is a 89 year old male who presents with concern of urinary frequency for months now Patient states niece had him sent in because she wants to know what is wrong with him . Patient states that he has kidney diseas e. Patient states that he is leaking urine all the time . He denies any systemic symptoms incuding nausea , fever or vomitting. He states his niece wanted him brought in again because she wanted to figure what his urinary problem was. He has been seen in the ER on multiple occasions but has never been seen by urology . He is on several medications for his prostate Problem List:    There are no active problems to display for this patient.       Past Medical History:    No past medical history on file.    Past Surgical History:    No past surgical history on file.    Family History:    No family history on file.    Social History:  Marital Status:  Single [1]  Social History   Substance Use Topics     Smoking status: Former Smoker     Smokeless tobacco: Never Used     Alcohol use No        Medications:      atorvastatin (LIPITOR) 40 MG tablet   clopidogrel (PLAVIX) 75 MG tablet   Cyanocobalamin (VITAMIN B-12 CR) 1000 MCG TBCR   finasteride (PROSCAR) 5 MG tablet   oxybutynin (DITROPAN XL) 5 MG 24 hr tablet   terazosin (HYTRIN) 5 MG capsule   amLODIPine (NORVASC) 5 MG tablet         Review of Systems   Constitutional: Negative.    HENT: Negative.    Respiratory: Negative.    Cardiovascular: Negative.    Gastrointestinal: Negative.    Genitourinary:        Incontinence     All other systems reviewed and are negative.      Physical Exam   BP: 143/71  Heart Rate: 72  Temp: 97.3  F (36.3  C)  Resp: 18  SpO2: 100 %      Physical Exam   Constitutional: He appears well-developed and well-nourished.   Cardiovascular: Normal rate and regular rhythm.    Pulmonary/Chest: Effort normal and breath sounds normal.    Abdominal: Soft. Bowel sounds are normal. He exhibits no distension and no mass. There is no tenderness. There is no rebound and no guarding.   Bladder scan for 250 ml    Neurological: He is alert.   Nursing note and vitals reviewed.      ED Course     ED Course     Procedures     Patient presents to  The ER by EMS with complain t of persistent urinary incontinence He has been seen in the ER multiple times previously for this same issue. He denies symptoms of infection . He does not have urinary retention . He is bladder scanned for 250 ml. Labs including urinalysis are completed . Bladder scan done and patient with residual volume of only 250.  Urinalysis negative and labs not suggestive of infection , obstruction , or significant kidney disease .  Patient already on multiple medications for his prostatism . Discussed with patient reommendation for urology consult. Urology consult done prior to discharge from ER     Results for orders placed or performed during the hospital encounter of 09/25/18 (from the past 24 hour(s))   CBC with platelets differential   Result Value Ref Range    WBC 6.8 4.0 - 11.0 10e9/L    RBC Count 3.59 (L) 4.4 - 5.9 10e12/L    Hemoglobin 11.2 (L) 13.3 - 17.7 g/dL    Hematocrit 32.4 (L) 40.0 - 53.0 %    MCV 90 78 - 100 fl    MCH 31.2 26.5 - 33.0 pg    MCHC 34.6 31.5 - 36.5 g/dL    RDW 13.2 10.0 - 15.0 %    Platelet Count 239 150 - 450 10e9/L    Diff Method Automated Method     % Neutrophils 72.2 %    % Lymphocytes 12.6 %    % Monocytes 12.3 %    % Eosinophils 1.9 %    % Basophils 0.6 %    % Immature Granulocytes 0.4 %    Nucleated RBCs 0 0 /100    Absolute Neutrophil 4.9 1.6 - 8.3 10e9/L    Absolute Lymphocytes 0.9 0.8 - 5.3 10e9/L    Absolute Monocytes 0.8 0.0 - 1.3 10e9/L    Absolute Eosinophils 0.1 0.0 - 0.7 10e9/L    Absolute Basophils 0.0 0.0 - 0.2 10e9/L    Abs Immature Granulocytes 0.0 0 - 0.4 10e9/L    Absolute Nucleated RBC 0.0        Medications - No data to display    Assessments  & Plan (with Medical Decision Making)     I have reviewed the nursing notes.    I have reviewed the findings, diagnosis, plan and need for follow up with the patient.      New Prescriptions    No medications on file       Final diagnoses:   Functional urinary incontinence       9/25/2018   [facility]     Kristy Ahuja MD  09/27/18 1800

## 2018-09-25 NOTE — ED NOTES
Presents to emergency room via [facility] with c/o generalized weakness. C/o urinary frequency, urgency, and incontinence.   Hx kidney issues and shingles. Awake and alert. Denies pain. Denies SOB. Afebrile. Negative fast exam.   Able to move all extremities. Bilateral lower extremity edema. Call light within reach. Monitors on.

## 2018-09-26 NOTE — DISCHARGE INSTRUCTIONS
Urinary Incontinence (Male)    Urinary incontinence means not being able to control the release of urine from the bladder.  Causes  Common causes of urinary incontinence in men include:    Infection    Certain medicines    Aging    Poor pelvic muscle tone    Bladder spasms    Obesity    Urinary retention  Nervous system diseases, diabetes, sleep apnea, urinary tract infections, prostate surgery, and pelvic trauma can also cause incontinence. Constipation and smoking have also been identified as risk factors.  Symptoms    Urge incontinence (also called  overactive bladder ) is a sudden urge to urinate even though there may not be much urine in the bladder. The need to urinate often during the night is common. It is due to bladder spasms.    Stress incontinence is involuntary urine leakage that can occur with sneezing, coughing, and other actions that put stress on the bladder.  Treatment  Treatment of urinary incontinence depends on the cause. Infections of the bladder are treated with antibiotics. Urinary retention is treated with a bladder catheter.  Home care  Follow these guidelines when caring for yourself at home:    Don't consume foods and drinks that may irritate the bladder. These include drinks containing alcohol, caffeinate, or carbonation; chocolate; and acidic fruits and juices.    Limit fluid intake to 6 to 8 cups a day.    Lose weight if you are overweight. This will reduce your symptoms.    If needed, wear absorbent pads to catch urine. Change pads frequently to maintain hygiene and prevent skin and bladder infections.    Bathe daily to maintain good hygiene.    If an antibiotic was prescribed to treat a bladder infection, be sure to take it until finished, even if you are feeling better before then. This is to make sure your infection has cleared.    If a catheter was left in place, it is important to keep bacteria from getting into the collection bag. Don't disconnect the catheter from the  collection bag.    Use a leg band to secure the catheter drainage tube, so it does not pull on the catheter. Drain the collection bag when it becomes full using the drain spout at the bottom of the bag. Don't disconnect the bag from the catheter.    Don't pull on or try to remove a catheter. The catheter must be removed by a healthcare provider.  Follow-up care  Follow up with your healthcare provider, or as advised.  When to seek medical advice  Call your healthcare provider right away if any of these occur:    Fever over 100.4 F (38 C), or as directed by your healthcare provider    Bladder pain or fullness    Abdominal swelling, nausea, or vomiting    Back pain    Weakness, dizziness, or fainting    If a catheter was left in place, return if:  ? Catheter falls out  ? Catheter stops draining for 6 hours  Date Last Reviewed: 10/1/2017    4690-8197 [employer]. [address] [place] [address]. All rights reserved. This information is not intended as a substitute for professional medical care. Always follow your healthcare professional's instructions.

## 2018-09-26 NOTE — ED NOTES
Patient transferred to [facility] via [facility]. Paperwork sent with pt and [facility]. Denies pain. Awake and alert. No confusion noted while in ED.   No urinary incontinence while in ED. Afebrile.

## 2018-09-26 NOTE — ED NOTES
Straight cath done. Pt tolerated well. Urine sent to lab.   Emptied bladder. 200cc cloudy [place] urine.

## 2018-09-26 NOTE — ED NOTES
Updated niece via telephone on plan of care. Ok with pt to give information to niece via telephone.

## 2018-10-30 NOTE — ED AVS SNAPSHOT
" [facility]    [address] [address]    Phone:  [phone]                                       Danny Muniz   MRN: [id number]    Department:  [facility]   Date of Visit:  10/30/2018           Patient Information     Date Of Birth          1/27/1928        Your diagnoses for this visit were:     UTI (urinary tract infection) with pyuria        You were seen by Kristy Ahuja MD.      Follow-up Information     Follow up with [facility].    Specialty:  EMERGENCY MEDICINE    Why:  As needed, If symptoms worsen or any other concerns     Contact information:    [address] [address] [phone]    Additional information:    From [facility]: Take [address]. Turn left at [address]. Turn left at the first stoplight on [address]. At the first stop sign, take a right onto [address]. Take a left into the parking lot and continue through until you reach the North enterance of the building.       From [place]: Take [address]. Take the [address] ramp towards [place]. Turn left onto [address]. Take a slight right onto [address] [facility]. Turn left at the first stoplight on [address]. At the first stop sign, take a right onto [address]. Take a left into the parking lot and continue through until you reach the North enterance of the building.       From [place]: Take [address]. Take a right at [address] [facility] [address]. At the first stop sign, take a right onto [address]. Take a left into the parking lot and continue through until you reach the North enterance of the building.         Discharge Instructions          * BLADDER INFECTION: Male (Adult)    A bladder infection (\"cystitis\" or \"UTI\") usually causes a constant urge to urinate, and a burning when passing urine. Urine may be cloudy, smelly or dark. There may be also be pain in the lower " abdomen.  Cystitis in males is not common. It may be caused by a partial blockage in the urinary system that keeps the bladder from emptying completely. This is most often related to an enlarged prostate gland.  HOME CARE:  1. Drink lots of fluids (at least 6-8 glasses a day). This will flush the bacteria out of your bladder. Cranberry juice has been shown to help clear out the bacteria.  2. Avoid sexual intercourse until your symptoms are gone.  3. A bladder infection is treated with antibiotics. You may also be given Pyridium (generic - phenazopyridine) to reduce burning with urination. This will cause urine to become a bright orange color, which can stain clothing.  FOLLOW UP with your doctor or this facility if ALL symptoms have not cleared within five days. It is important to keep your follow up appointment to discuss with your doctor the need for further tests of the urinary tract.  GET PROMPT MEDICAL ATTENTION if any of the following occur:    Fever over 101  F (38.3  C)    No improvement by the third day of treatment    Increasing back or abdominal pain    Repeated vomiting; unable to keep medicine down    Weakness, dizziness or fainting    [id number] [employer]!. [address] [place] [address]. All rights reserved. This information is not intended as a substitute for professional medical care. Always follow your healthcare professional's instructions.  This information has been modified by your health care provider with permission from the publisher.         Review of your medicines      START taking        Dose / Directions Last dose taken    cephALEXin 500 MG capsule   Commonly known as:  KEFLEX   Dose:  500 mg   Quantity:  28 capsule        Take 1 capsule (500 mg) by mouth 4 times daily for 7 days   Refills:  0          Our records show that you are taking the medicines listed below. If these are incorrect, please call your family doctor or clinic.        Dose / Directions Last  dose taken    amLODIPine 5 MG tablet   Commonly known as:  NORVASC   Dose:  5 mg   Quantity:  14 tablet        Take 1 tablet (5 mg) by mouth daily for 14 days   Refills:  0        atorvastatin 40 MG tablet   Commonly known as:  LIPITOR   Dose:  40 mg        Take 40 mg by mouth   Refills:  0        clopidogrel 75 MG tablet   Commonly known as:  PLAVIX   Dose:  75 mg        Take 75 mg by mouth   Refills:  0        finasteride 5 MG tablet   Commonly known as:  PROSCAR   Dose:  5 mg        Take 5 mg by mouth   Refills:  0        terazosin 5 MG capsule   Commonly known as:  HYTRIN        TAKE 1 CAPSULE BY MOUTH AT BEDTIME   Refills:  0        trospium 20 MG tablet   Commonly known as:  SANCTURA   Dose:  20 mg        Take 20 mg by mouth 2 times daily (before meals)   Refills:  0        Vitamin B-12 CR 1000 MCG Tbcr        One daily   Refills:  0                Prescriptions were sent or printed at these locations (1 Prescription)                   [id number] - [place], [place] - [address]   [address] [place] [address]    Telephone:  [phone]   Fax:  [phone]   Hours:                  E-Prescribed (1 of 1)         cephALEXin (KEFLEX) 500 MG capsule                Procedures and tests performed during your visit     CBC with platelets differential    Chest  XR, 1 view portable    Comprehensive metabolic panel    EKG 12 lead    Head CT w/o contrast    Lactic acid whole blood    Troponin I    UA with Microscopic reflex to Culture    Urine Culture Aerobic Bacterial      Orders Needing Specimen Collection     None      Pending Results     Date and Time Order Name Status Description    10/30/2018 1408 Urine Culture Aerobic Bacterial In process             Pending Culture Results     Date and Time Order Name Status Description    10/30/2018 1408 Urine Culture Aerobic Bacterial In process             Thank you for choosing [facility]       Thank you for choosing [facility] for your care. Our goal is  "always to provide you with excellent care. Hearing back from our patients is one way we can continue to improve our services. Please take a few minutes to complete the written survey that you may receive in the mail after you visit with us. Thank you!        [facility] Information     [facility] lets you send messages to your doctor, view your test results, renew your prescriptions, schedule appointments and more. To sign up, go to [email or web address] . Click on \"Log in\" on the left side of the screen, which will take you to the Welcome page. Then click on \"Sign up Now\" on the right side of the page.     You will be asked to enter the access code listed below, as well as some personal information. Please follow the directions to create your username and password.     Your access code is: [email or web address]  Expires: 2018  7:39 PM     Your access code will  in 90 days. If you need help or a new code, please call your [facility] clinic or [phone].        Care EveryWhere ID     This is your Care EveryWhere ID. This could be used by other organizations to access your [facility] medical records  [id number]        Equal Access to Services     CHEIKH HOYT : Hadii singh Lerma, wamesfinda freddy, qaybta kaalmada raad, ismael arzate. So [facility] [phone].    ATENCIÓN: Si habla español, tiene a rodriguez disposición servicios gratuitos de asistencia lingüística. Saskia al [phone].    We comply with applicable federal civil rights laws and [place] laws. We do not discriminate on the basis of race, color, national origin, age, disability, sex, sexual orientation, or gender identity.            After Visit Summary       This is your record. Keep this with you and show to your community pharmacist(s) and doctor(s) at your next visit.                  "

## 2018-10-30 NOTE — ED NOTES
Pt to and from ct, t/c from [facility], she states pt did slide out of wheelchair at about 7 am today and hit his head but had no injuries or c/o pain then.

## 2018-10-30 NOTE — ED NOTES
Hand off report called to [facility] Nurse Manager at [facility] with opportunity for questions. Pt discharged to [facility] via [facility].

## 2018-10-30 NOTE — ED NOTES
Patient presents to emergency room via [place] [facility] with c/o slurred speech, facial droop, and unequal hand grasps. Resides at [facility] in [place]. Staff reports last known well time was this am. EMS reports negative fast. Pt was able to ambulate for EMS with a steady gait. EMS reports increased confusion and weakness.   Pt states he was nauseated yesterday. Fell twice in the past couple of weeks. Awake and alert. Speech is clear. Confusion noted. Equal hand grasps. No arm drift. Left facial droop noted not sure if this was from past CVA. Dr. Ndiaye at bedside for neuro eval.

## 2018-10-30 NOTE — ED NOTES
Pt has been quick cathed for urine urojet used prior, pt tolerated well, urine only scant dark and strong odor.

## 2018-10-30 NOTE — ED AVS SNAPSHOT
[facility]    [address]    [place] [address]    Phone:  [phone]                                       Danny Muniz   MRN: [id number]    Department:  [facility]   Date of Visit:  10/30/2018           After Visit Summary Signature Page     I have received my discharge instructions, and my questions have been answered. I have discussed any challenges I see with this plan with the nurse or doctor.    ..........................................................................................................................................  Patient/Patient Representative Signature      ..........................................................................................................................................  Patient Representative Print Name and Relationship to Patient    ..................................................               ................................................  Date                                   Time    ..........................................................................................................................................  Reviewed by Signature/Title    ...................................................              ..............................................  Date                                               Time          22EPIC Rev 08/18

## 2018-10-30 NOTE — DISCHARGE INSTRUCTIONS
"   * BLADDER INFECTION: Male (Adult)    A bladder infection (\"cystitis\" or \"UTI\") usually causes a constant urge to urinate, and a burning when passing urine. Urine may be cloudy, smelly or dark. There may be also be pain in the lower abdomen.  Cystitis in males is not common. It may be caused by a partial blockage in the urinary system that keeps the bladder from emptying completely. This is most often related to an enlarged prostate gland.  HOME CARE:  1. Drink lots of fluids (at least 6-8 glasses a day). This will flush the bacteria out of your bladder. Cranberry juice has been shown to help clear out the bacteria.  2. Avoid sexual intercourse until your symptoms are gone.  3. A bladder infection is treated with antibiotics. You may also be given Pyridium (generic - phenazopyridine) to reduce burning with urination. This will cause urine to become a bright orange color, which can stain clothing.  FOLLOW UP with your doctor or this facility if ALL symptoms have not cleared within five days. It is important to keep your follow up appointment to discuss with your doctor the need for further tests of the urinary tract.  GET PROMPT MEDICAL ATTENTION if any of the following occur:    Fever over 101  F (38.3  C)    No improvement by the third day of treatment    Increasing back or abdominal pain    Repeated vomiting; unable to keep medicine down    Weakness, dizziness or fainting    9549-4149 [employer]. [address] [place] [id number]. All rights reserved. This information is not intended as a substitute for professional medical care. Always follow your healthcare professional's instructions.  This information has been modified by your health care provider with permission from the publisher.    "

## 2018-11-01 NOTE — PROGRESS NOTES
Pt on keflex pending final results.  Petra Jaramillo Certified  Physician Assistant  10/31/2018  8:46 PM  [facility]

## 2018-11-01 NOTE — ED PROVIDER NOTES
History     Chief Complaint   Patient presents with     Generalized Weakness     HPI  Danny Muniz is a 89 year old male who\ presents to the ER from the nursing home for evaluation of weakness and concern of possible CVA. Upon arrival negative FAST  Exam . Patient states he is uncertain why he is in ER . His speech is clear. No focal neurologic deficit is appreciated. He denies cough, sob , chest pain , numbness or weakness of extremities. He does have a history of previous CVA and does have subtle facial asymmetry which is likely chronic     Problem List:    There are no active problems to display for this patient.       Past Medical History:    No past medical history on file.    Past Surgical History:    No past surgical history on file.    Family History:    No family history on file.    Social History:  Marital Status:  Single [1]  Social History   Substance Use Topics     Smoking status: Former Smoker     Smokeless tobacco: Never Used     Alcohol use No        Medications:      amLODIPine (NORVASC) 5 MG tablet   atorvastatin (LIPITOR) 40 MG tablet   cephALEXin (KEFLEX) 500 MG capsule   clopidogrel (PLAVIX) 75 MG tablet   Cyanocobalamin (VITAMIN B-12 CR) 1000 MCG TBCR   finasteride (PROSCAR) 5 MG tablet   terazosin (HYTRIN) 5 MG capsule   trospium (SANCTURA) 20 MG tablet         Review of Systems   Constitutional: Positive for activity change. Negative for appetite change, chills, diaphoresis, fatigue, fever and unexpected weight change.   HENT: Negative.    Respiratory: Negative.    Cardiovascular: Negative.    Gastrointestinal: Negative.    Genitourinary: Negative.    Musculoskeletal: Negative.    Neurological: Positive for weakness. Negative for dizziness, tremors, syncope, speech difficulty, numbness and headaches.       Physical Exam   BP: 129/88  Heart Rate: 77  Temp: 98  F (36.7  C)  Resp: 18  SpO2: 97 %      Physical Exam   Constitutional: He is oriented to person, place, and time. He appears  well-developed and well-nourished. No distress.   HENT:   Head: Normocephalic and atraumatic.   Right Ear: External ear normal.   Left Ear: External ear normal.   Mucous membranes dry . Uvula midline gag intact . Slight asymmetry of nasolabial folds,    Eyes: EOM are normal. Pupils are equal, round, and reactive to light.   Neck: Normal range of motion. Neck supple. No JVD present. No tracheal deviation present. No thyromegaly present.   Cardiovascular: Normal rate, regular rhythm and normal heart sounds.    Pulmonary/Chest: Effort normal and breath sounds normal. No stridor. No respiratory distress. He has no wheezes. He has no rales. He exhibits no tenderness.   Abdominal: Soft. Bowel sounds are normal. He exhibits no distension and no mass. There is no tenderness. There is no rebound and no guarding.   Musculoskeletal: Normal range of motion.   No pronator drift upper or lower extremity , Normal cerebellar testing of fine finger movements and heel shin    Lymphadenopathy:     He has no cervical adenopathy.   Neurological: He is alert and oriented to person, place, and time. A cranial nerve deficit (milld asymmetry of nasolabial fold , no other asymmetry ) is present.   Skin: Skin is warm and dry. He is not diaphoretic.   Psychiatric: He has a normal mood and affect.   Nursing note and vitals reviewed.      ED Course     ED Course     Procedures        Patient presented to ER by EMS for evaluation of weakness and concern of possible CVA . EMS report taken . Negative FAST exam other than very subtle aysmmetry in nasolabial folds which is likely chronic from previous CVA . Cardiac monitors placed EKG obtained. Patient with sinus arrythmia but no acute ST segment changes or concerning arrhythmia. Medical records reviewed. History and exam completed. Labs and diagnostics ordered. ON exam patient appears dehydrated IV fluid bolus ordered. Labs returned showing significant UTI . UC ordered for sensitivities but patient  given first dose of Rocephin in ER prior to discharge back to ER with Rx for keflex while await culture results     Results for orders placed or performed during the hospital encounter of 10/30/18   Head CT w/o contrast    Narrative    PROCEDURE: CT HEAD W/O CONTRAST 10/30/2018 12:37 PM    HISTORY: confusion, fall 3 days ago hitting his head, currently on  Plavix.;     COMPARISONS: 7/25/2018.    Meds/Dose Given: None.    TECHNIQUE: Axial noncontrast enhanced images with coronal and sagittal  reformatted images.    FINDINGS: There is atrophy with fairly extensive areas of small vessel  ischemic change in the periventricular white matter. This is similar  to the previous exam. No acute mass or midline shift is seen. There is  no extra-axial fluid collection or focal hemorrhage.    Bone windows show no fracture. Visualized paranasal sinuses and  mastoid air cells are clear.         Impression    IMPRESSION: Extensive small vessel ischemic change. No acute mass  effect or hemorrhage.    ANTHONY ENCARNACION MD   Chest  XR, 1 view portable    Narrative    PROCEDURE: XR CHEST PORT 1 VW 10/30/2018 2:04 PM    HISTORY: confusion rule out pneumonia;     COMPARISONS: 9/18/2018.    TECHNIQUE: 2 views were obtained.    FINDINGS: Heart is stable in size. There is some elongation of the  thoracic aorta. Lungs are relatively clear and no pleural effusion is  seen.    There appears to be chronic rotator cuff disease on the right. There  is degenerative change in the right AC joint.      Impression    IMPRESSION: No acute disease.         ANTHONY ENCARNACION MD   UA with Microscopic reflex to Culture   Result Value Ref Range    Color Urine Yellow     Appearance Urine Slightly Cloudy     Glucose Urine Negative NEG^Negative mg/dL    Bilirubin Urine Negative NEG^Negative    Ketones Urine Negative NEG^Negative mg/dL    Specific Gravity Urine 1.011 1.003 - 1.035    Blood Urine Small (A) NEG^Negative    pH Urine 8.0 4.7 - 8.0 pH    Protein  Albumin Urine 100 (A) NEG^Negative mg/dL    Urobilinogen mg/dL Normal 0.0 - 2.0 mg/dL    Nitrite Urine Positive (A) NEG^Negative    Leukocyte Esterase Urine Large (A) NEG^Negative    Source Catheterized Urine     WBC Urine 32 (H) 0 - 5 /HPF    RBC Urine 5 (H) 0 - 2 /HPF    Bacteria Urine Few (A) NEG^Negative /HPF    Mucous Urine Present (A) NEG^Negative /LPF   CBC with platelets differential   Result Value Ref Range    WBC 11.4 (H) 4.0 - 11.0 10e9/L    RBC Count 4.26 (L) 4.4 - 5.9 10e12/L    Hemoglobin 13.3 13.3 - 17.7 g/dL    Hematocrit 38.7 (L) 40.0 - 53.0 %    MCV 91 78 - 100 fl    MCH 31.2 26.5 - 33.0 pg    MCHC 34.4 31.5 - 36.5 g/dL    RDW 13.7 10.0 - 15.0 %    Platelet Count 167 150 - 450 10e9/L    Diff Method Automated Method     % Neutrophils 83.8 %    % Lymphocytes 6.3 %    % Monocytes 9.1 %    % Eosinophils 0.2 %    % Basophils 0.3 %    % Immature Granulocytes 0.3 %    Nucleated RBCs 0 0 /100    Absolute Neutrophil 9.5 (H) 1.6 - 8.3 10e9/L    Absolute Lymphocytes 0.7 (L) 0.8 - 5.3 10e9/L    Absolute Monocytes 1.0 0.0 - 1.3 10e9/L    Absolute Eosinophils 0.0 0.0 - 0.7 10e9/L    Absolute Basophils 0.0 0.0 - 0.2 10e9/L    Abs Immature Granulocytes 0.0 0 - 0.4 10e9/L    Absolute Nucleated RBC 0.0    Comprehensive metabolic panel   Result Value Ref Range    Sodium 137 133 - 144 mmol/L    Potassium 4.4 3.4 - 5.3 mmol/L    Chloride 103 94 - 109 mmol/L    Carbon Dioxide 26 20 - 32 mmol/L    Anion Gap 8 3 - 14 mmol/L    Glucose 122 (H) 70 - 99 mg/dL    Urea Nitrogen 47 (H) 7 - 30 mg/dL    Creatinine 1.20 0.66 - 1.25 mg/dL    GFR Estimate 57 (L) >60 mL/min/1.7m2    GFR Estimate If Black 69 >60 mL/min/1.7m2    Calcium 9.3 8.5 - 10.1 mg/dL    Bilirubin Total 1.6 (H) 0.2 - 1.3 mg/dL    Albumin 3.5 3.4 - 5.0 g/dL    Protein Total 7.1 6.8 - 8.8 g/dL    Alkaline Phosphatase 84 40 - 150 U/L    ALT 22 0 - 70 U/L    AST 18 0 - 45 U/L   Lactic acid whole blood   Result Value Ref Range    Lactic Acid 1.2 0.7 - 2.0 mmol/L    Troponin I   Result Value Ref Range    Troponin I ES 0.015 0.000 - 0.045 ug/L   Urine Culture Aerobic Bacterial   Result Value Ref Range    Specimen Description Catheterized Urine     Culture Micro >100,000 colonies/mL  Proteus mirabilis   (A)        Susceptibility    Proteus mirabilis - ADRIANA     Amoxicillin/Clav <=2 Sensitive ug/mL     AMPICILLIN <=2 Sensitive ug/mL     CEFAZOLIN* <=4 Sensitive ug/mL      * Cefazolin ADRIANA breakpoints are for the treatment of uncomplicated urinary tract infections.  For the treatment of systemic infections, please contact the laboratory for additional testing.     CEFTAZIDIME <=1 Sensitive ug/mL     CEFTRIAXONE <=1 Sensitive ug/mL     CIPROFLOXACIN <=0.25 Sensitive ug/mL     GENTAMICIN <=1 Sensitive ug/mL     LEVOFLOXACIN <=0.12 Sensitive ug/mL     NITROFURANTOIN* 128 Resistant ug/mL      * Intrinsically Resistant     TOBRAMYCIN <=1 Sensitive ug/mL     Trimethoprim/Sulfa <=1/19 Sensitive ug/mL     AMPICILLIN/SULBACTAM <=2 Sensitive ug/mL     Piperacillin/Tazo <=4 Sensitive ug/mL     CEFEPIME <=1 Sensitive ug/mL             No results found for this or any previous visit (from the past 24 hour(s)).    Medications   lidocaine (XYLOCAINE) 2 % topical gel (  Given 10/30/18 1338)       Assessments & Plan (with Medical Decision Making)     I have reviewed the nursing notes.    I have reviewed the findings, diagnosis, plan and need for follow up with the patient.      Discharge Medication List as of 10/30/2018  2:50 PM      START taking these medications    Details   cephALEXin (KEFLEX) 500 MG capsule Take 1 capsule (500 mg) by mouth 4 times daily for 7 days, Disp-28 capsule, R-0, E-Prescribe             Final diagnoses:   UTI (urinary tract infection) with pyuria       10/30/2018   [facility]     Kristy Ahuja MD  11/01/18 [id number]

## 2018-12-18 PROBLEM — S31.000A SACRAL WOUND: Status: ACTIVE | Noted: 2018-01-01

## 2018-12-18 PROBLEM — R79.89 ABNORMAL LFTS: Status: ACTIVE | Noted: 2018-01-01

## 2018-12-18 PROBLEM — N17.9 ACUTE RENAL FAILURE (H): Status: ACTIVE | Noted: 2018-01-01

## 2018-12-18 PROBLEM — E87.1 HYPONATREMIA: Status: ACTIVE | Noted: 2018-01-01

## 2018-12-18 PROBLEM — E86.0 DEHYDRATION: Status: ACTIVE | Noted: 2018-01-01

## 2018-12-18 PROBLEM — N39.0 URINARY TRACT INFECTION WITHOUT HEMATURIA: Status: ACTIVE | Noted: 2018-01-01

## 2018-12-18 NOTE — H&P
History and Physical     Danny Muniz MRN# [id number]   YOB: 1928 Age: 89 year old      Date of Admission:  12/18/2018    Primary care provider: Erick Estevez          Assessment and Plan:   Active Problems:    Dehydration    Assessment: Likely secondary to poor intake  Plan: We will start the patient on IV fluids monitor BMP very closely this is associated with hypernatremia and hyperchloremia and high anion gap.     Acute renal failure (H)    Assessment: Most likely prerenal  Plan: I will start the patient on normal saline, there is no history of CHF will give IV fluids at the rate of 75 mL/h, acute renal failure is associated with hyponatremia hypochloremia and high anion gap all of which is most likely second to severe dehydration.    Urinary tract infection without hematuria    Assessment: Urinalysis  and culture obtained in the clinic    Plan: We will start the patient on IV ceftriaxone follow the cultures.Navas cx on 10/30/2018  Proteus mirabilis-sensitive to Ceftriaxone.        Leukocytosis:  Most likely secondary to UTI, will obtain the lactic acid levels.      Abnormal LFTs    Assessment: Most likely part of the infection    Plan: We will follow LFTs in the morning      Hypernatremia    Assessment: Most likely secondary to dehydration    Plan: Follow electrolytes daily      Sacral wound    Assessment: He has sacral and coccygeal wound draining yellowish discharge    Plan: I will obtain the wound culture start the patient on vancomycin and get wound care inpatient consult.    CODE STATUS: DNR/DNI discussed with the patient  DVT prophylaxis: Heparin  IV fluids normal saline at 75 mL/h.  IV and antibiotics: Vancomycin and ceftriaxone.  Consult: Wound care and PT and OT.  Diet: Cardiac  Disposition: To be determined         Attestation:  Jamie Bustillo MD           Chief Complaint:   Weakness  Dehydration  Source of information basically from old medical records patient is a very poor  historian he only whispers         History of Present Illness:   This patient is a 89 year old male who presents with significant past medical history of overactive bladder, benign prostatic hypertrophy, overactive bladder, hypertension, bronchitis, gout and osteoarthritis was taken by his relative to his primary care physician because of the weakness patient had blood work done which was abnormal and the patient was sent to the hospital for further evaluation.  Patient denies any chest pains any shortness of breath any nausea vomiting or any abdominal pain but he admits that his appetite has been weak for the past 3 weeks and he has not been eating or drinking well.  She had a chest x-ray done and blood work at his primary care physician clinic the chest x-ray showed no evidence of acute cardiopulmonary process but the lab work shows abnormal LFTs acute renal failure and possible UTI                 Past Medical History:   overactive bladder, BPH, hypertension, bronchitis, gout, and osteoarthritis                  Past Surgical History:   He has had a right total hip replacement. He has had his tonsils removed                  Social History:     Social History     Tobacco Use     Smoking status: Former Smoker     Smokeless tobacco: Never Used   Substance Use Topics     Alcohol use: No      He is a former smoker with about a 15-pack-year smoking history.                Family History:   Family history is negative for bladder, kidney, or prostate cancer            Immunizations:     There is no immunization history on file for this patient.         Allergies:     Allergies   Allergen Reactions     Other [Seasonal Allergies]      Hay             Medications:     Medications Prior to Admission   Medication Sig Dispense Refill Last Dose     amLODIPine (NORVASC) 5 MG tablet Take 1 tablet (5 mg) by mouth daily for 14 days 14 tablet 0 10/30/2018 at Unknown time     atorvastatin (LIPITOR) 40 MG tablet Take 40 mg by mouth    10/30/2018 at Unknown time     clopidogrel (PLAVIX) 75 MG tablet Take 75 mg by mouth   10/29/2018 at Unknown time     Cyanocobalamin (VITAMIN B-12 CR) 1000 MCG TBCR One daily   10/30/2018 at Unknown time     finasteride (PROSCAR) 5 MG tablet Take 5 mg by mouth   10/29/2018 at Unknown time     terazosin (HYTRIN) 5 MG capsule TAKE 1 CAPSULE BY MOUTH AT BEDTIME   10/29/2018 at Unknown time     trospium (SANCTURA) 20 MG tablet Take 20 mg by mouth 2 times daily (before meals)   10/30/2018 at Unknown time             Review of Systems:   Review of systems not obtained due to patient factors -patient whispers           Physical Exam:   Vitals were reviewed  Patient Vitals for the past 12 hrs:   BP Temp Temp src Pulse Resp SpO2   12/18/18 1710 147/97 96.3  F (35.7  C) Tympanic 94 18 92 %       Constitutional: He appears well-developed and well-nourished.    Patient is a very pleasant, and whispers  HENT:   Right Ear: External ear normal.   Left Ear: External ear normal.   Mouth/Throat: Oropharynx is clear and moist.    Eyes: Conjunctivae and EOM are normal. Pupils are equal, round, and reactive to light.   Cardiovascular: Normal rate and regular rhythm.    Pulmonary/Chest: Effort normal and breath sounds normal.   Abdominal: Soft. There is no tenderness. There is no guarding.   Musculoskeletal:   No evidence of hip discomfort on passive range of motion.  Knees are unremarkable.  Bilateral shoulders are unremarkable.   Neurological: He is alert.   He whispers to most of the questions, because of his weakness complete neurological examination has not been able to be obtained but he is moving all 4 extremities  Oriented the person place and time.   Skin: Skin is warm and dry.  Large bedsore at the coccygeal and sacral area might have some tunneling wound  Psychiatric: He has a normal mood and affect.  But he whispers         Data:      HEMOGRAM/DIFFERENTIAL (12/18/2018 3:26 PM CST)  HEMOGRAM/DIFFERENTIAL (12/18/2018 3:26 PM  CST)   Component Value Ref Range Performed At Pathologist Signature   WBC 17.7 (H) 3.2 - 11.0 10*9/L [facility]     RBC 5.10 4.14 - 5.76 10*12/L [facility]     HGB 15.9 12.9 - 16.9 g/dL [facility]     HCT 49.1 38.4 - 49.7 % [facility]     MCV 96.3 81.4 - 99.0 fL [facility]     MCH 31.2 26.7 - 33.1 pg [facility]     MCHC 32.4 31.6 - 35.5 g/dL [facility]     RDW 14.8 (H) 11.3 - 14.6 % [facility]      130 - 375 10*9/L [facility]     Neutrophils % 88.4 % [facility]     Lymphocytes % 6.2 % [facility]     Monocytes % 4.8 % [facility]     Eosinophils % 0.2 % [facility]     Basophils % 0.1 % [facility]     Immature Granulocytes % 0.3 % [facility]     Neutrophils Absolute 15.6 (H) 1.5 - 7.6 10*9/L [facility]     Lymphocytes Absolute 1.1 0.8 - 3.3 10*9/L [facility]     Monocytes Absolute 0.9 0.2 - 0.9 10*9/L [facility]     Eosinophils Absolute 0.0 0.0 - 0.4 10*9/L [facility]     Basophils Absolute 0.0 0.0 - 0.1 10*9/L [facility]     Immature Granulocytes Absolute           Component Value Ref Range Performed At Pathologist Signature   Sodium 156 (H) 134 - 143 mEq/L [facility]     Potassium 4.2 3.4 - 5.1 mEq/L [facility]     Chloride 115 (H) 99 - 110 mEq/L [facility]     Carbon Dioxide 21 19 - 29 mEq/L [facility]     Anion Gap 20.0 (H) 3.0 - 15.0 mEq/L [facility]     Blood Urea Nitrogen 105 (H) 5 - 24 mg/dL [facility]     Creatinine 2.08 (H) 0.70 - 1.20 mg/dL [facility]     Glomerular Filtration Rate 30 (L)Comment: Complications of CKD and  risk of cardiovascular disease increase when GFR is below 60ml.min/1.73m2. A persistently reduced GFR is a specific indication of Chronic Kidney Disease. The eGFR calculation has not been validated in patients >70yrs. >60 mL/min/1.73 m*2 [facility]     Calcium 10.3 8.4 - 10.5 mg/dL [facility]     Glucose 210 (H) 70 - 99 mg/dL [facility]     Protein, Total 7.9 6.0 - 8.0 g/dL [facility]     Albumin 3.3 (L) 3.5 - 5.0 g/dL [facility]     Alkaline Phosphatase 110 40 - 150 IU/L [facility]     Aspartate Aminotransferase 529 (H) 10 - 40 IU/L [facility]     Alanine Aminotransferase 267 (H) 6 - 40 IU/L [facility]     Bilirubin, Total 1.3 (H) 0.2 - 1.2 mg/dL [facility]       COMPREHENSIVE METABOLIC PANEL (12/18/2018 3:26 PM CST)   Specimen       Component Value Ref Range Performed At Pathologist Signature   Urine Color Nikki (A) Yellow [facility]     Urine Appearance Cloudy (A) Clear [facility]     Urine Specific Gravity 1.020 1.003 - 1.035 [facility]     Urine pH 8.5 (A) 5.0 - 8.0 [facility]     Urine Glucose Negative Negative [facility]     Urine Ketones Negative Negative [facility]     Urine Protein >=300  (A) Negative, Trace mg/dL [facility]     Urine Nitrites Negative Negative [facility]     Urine Leukocyte Esterase Large (A) Negative [facility]       URINALYSIS, REFLEX TO CULTURE (12/18/2018 3:54 PM CST)   Specimen   Urine - Urine Straight Catheter           XR CHEST 2 VIEWS        HISTORY: Shortness of breath.         FINDINGS: Comparison made to 06/19/2007.        Heart size and pulmonary vasculature are within normal limits. There may be mild emphysema. No focal infiltrate to suggest pneumonia. No  pleural effusion or pneumothorax. Generalized osteopenia is noted.        IMPRESSION: No heart failure or definite pneumonia.

## 2018-12-19 PROBLEM — E43 SEVERE PROTEIN-CALORIE MALNUTRITION (H): Status: ACTIVE | Noted: 2018-01-01

## 2018-12-19 NOTE — PROGRESS NOTES
Call received from Reyna with [facility].  She advised that their facility was in the process of screening Danny for placement at their facility and that yesterdays appointment was to be the face to face they would have needed to move forward with their screening process.  Writer will continue to work with Reyna in regards to transition of care upon discharge.

## 2018-12-19 NOTE — PROGRESS NOTES
[facility]    Hospitalist Progress Note      Assessment & Plan   Danny Muniz is a 89 year old male who was admitted on 12/18/2018.  Patient presented to his primary care providers office to inquire about nursing home placement.  He has been at [facility] for some time and has been deteriorating over the last 6 months.  Has lost 27 pounds.  Has had issues with recurrent urinary tract infections.  Weakness.  Upon his arrival at the clinic his blood pressures were fine no fevers sats were fine however he was found to be severely dehydrated, and a recurrent urinary tract infection.  Patient needed to be admitted to the hospital for treatment and then placement in the nursing home.  Issues are as follows    1.  Acute renal failure: Patient's baseline creatinine back in September 2018 was 1.0 with a BUN of 28.  He is just not been fairly doing well and obviously not taking in adequate fluids as his BUN/creatinine over at the clinic was 105 and 2.08 respectively.  His serum sodium was quite elevated also at 156.  Previously back on October 30 it was 137.. So it appears as though predominately this is just acute renal failure secondary to probable volume depletion/dehydration.  His values this morning were sodium 155 and BUN/creatinine 125/2.11.  He is only been getting some IV normal saline overnight.  His urine output still is quite poor.  Hemodynamically seems to be fine in terms of his blood pressure however needs markedly increased IV fluids.  We will switch him over to half-normal saline at 125 cc an hour.  Continue to monitor his urine output.  Do not feel that there is a requirement for Gamble catheter placement at this time.    2.  Urinary tract infection: Patient has issues with recurrent urinary tract infections.  He is currently on Rocephin which should cover most everything.  We will continue with this until her cultures come back.  No fevers.  Although did have an elevated white blood cell  count.    3.  Sacral pressure ulcers: Patient has seen by wound care.  He does have several areas over his sacrum and coccyx.  Most of him are unstageable due to some slough.  Many ones have a pink base is stage II.  There is no obvious evidence of infection or purulence.  He is just going to have every other day dressing changes.  Opteform sacral dressing.  Try and keep him off this also.  They did do a swab which is growing out a gram-negative jimmy which is not surprising given his at times stool incontinence.  But no real signs of infection.  We will discontinue the vancomycin.    4.  Cardiac status.  Patient is hemodynamically stable at this time.  Did have an echocardiogram back in June 2018 which showed basically normal systolic function EF around 55-60%.  No signs of heart failure at this time.  Has been on amlodipine for blood pressure control.  Current pressures are fine we will hold that at this time.      5.  Overall decline: Patient is just not been doing well at his current residence.  Does have the significant pressure ulcers.  Very dehydrated.  Will definitely need structured nursing facility placement where he will receive more appropriate cares.  He is DNR/DNI status which was addressed here.    Oral intake is somewhat marginal.  Tongue is still very dry.  Our hope is that once I get him rehydrated then will be able to take more adequate in orally.  Chest x-ray was clear with no signs of any aspiration or pneumonia.  His oxygenation is good on room air.    Diet: Regular Diet Adult  Fluids: One half normal saline 125 an hour    # Pain Assessment:  Current Pain Score 12/19/2018   Patient currently in pain? denies   Pain score (0-10) -   Danny roca pain level was assessed and he currently denies pain.        DVT Prophylaxis: Heparin SQ  Code Status: DNR/DNI    Disposition: Expected discharge in 2-4 days once rehydrated.    Reese De Guzman    Interval History   Patient this morning has very whispered voice.   Difficult to really understand him.  He denies any obvious pains currently.  Not very hungry at all.  Tongue is very dry is somewhat thirsty.  He has not had any voiding since admission.  IV fluids were normal saline on admission.  Does require increased water including free water given his hyponatremia.  Will bump this up.  Would not Place Gamble catheter at this time.  He is not having any significant urinary retention.  Signs have been stable.    -Data reviewed today: I reviewed all new labs and imaging results over the last 24 hours. I personally reviewed no images or EKG's today.    Physical Exam   Temp: 97.4  F (36.3  C) Temp src: Temporal BP: 143/79 Pulse: 90 Heart Rate: 85 Resp: 20 SpO2: 94 % O2 Device: None (Room air)    Vitals:    12/18/18 1907 12/19/18 0705   Weight: 52.3 kg (115 lb 4.8 oz) 53 kg (116 lb 13.5 oz)     Vital Signs with Ranges  Temp:  [96.3  F (35.7  C)-98.4  F (36.9  C)] 97.4  F (36.3  C)  Pulse:  [90-94] 90  Heart Rate:  [77-93] 85  Resp:  [18-20] 20  BP: (127-162)/(64-99) 143/79  SpO2:  [91 %-96 %] 94 %  I/O last 3 completed shifts:  In: 1597 [P.O.:800; I.V.:797]  Out: 0     Peripheral IV 12/18/18 Left (Active)   Site Assessment WDL 12/19/2018  2:13 PM   Line Status Infusing 12/19/2018  2:13 PM   Phlebitis Scale 0-->no symptoms 12/19/2018  2:13 PM   Infiltration Scale 0 12/19/2018  2:13 PM   Number of days: 1       Pressure Injury 12/18/18 Posterior Coccyx large open areas to coccyx area (Active)   Wound Base Moist;Pink;White;Yellow 12/19/2018 10:33 AM   Nita-wound Assessment Denuded;Blanchable erythema 12/18/2018  5:40 PM   Drainage Amount Scant 12/19/2018 10:33 AM   Drainage Color/Characteristics Serosanguinous;Yellow 12/19/2018 10:33 AM   Dressing Foam 12/19/2018 11:56 AM   Dressing Status Applied 12/19/2018 11:56 AM   Number of days: 1     No line/device    Constitutional: Alert and oriented to self  HEENT: Normocephalic/atraumatic, PERRL, EOMI, mouth extremely dry, neck supple, no  LN.     Cardiovascular: RRR. No  Murmur, no  rubs, or gallops.  JVD that.  Bruits no.  Pulses 2+    Respiratory: Clear to auscultation bilaterally    Abdomen: Soft, nontender, nondistended, no organomegaly. Bowel sounds present    sacrum with dressing in place.    Extremities: Warm/dry. No edema scattered bruises and scrapes.    Neuro:   Non focal, cranial nerves intact, Moves all extremities.  Medications     NaCl 125 mL/hr at 12/19/18 1537       camphor-eucalyptus-menthol   Topical BID     cefTRIAXone  1 g Intravenous Q24H     finasteride  5 mg Oral Daily     heparin (porcine)  5,000 Units Subcutaneous Q12H     terazosin  5 mg Oral At Bedtime     tolterodine  1 mg Oral BID AC       Data   Recent Labs   Lab 12/19/18  0542 12/18/18  1753   WBC 22.4*  --    HGB 15.1  --    MCV 94  --     306   *  --    POTASSIUM 4.3  --    CHLORIDE 116*  --    CO2 26  --    *  --    CR 2.11* 1.99*   ANIONGAP 13  --    ARNOLD 8.6  --    *  --    ALBUMIN 2.6*  --    PROTTOTAL 6.8  --    BILITOTAL 0.7  --    ALKPHOS 98  --    *  --    *  --        No results found for this or any previous visit (from the past 24 hour(s)).

## 2018-12-19 NOTE — PLAN OF CARE
[facility] Inpatient Admission Note:    Patient admitted to 3214/3214-1 at approximately 1710 via cart accompanied by other:none from clinic . Report received from none, direct from clinic.  Patient transferred to bed via slide board.. Patient is alert and oriented X 3, denies pain; rates at 0 on 0-10 scale.  Patient oriented to room, unit, hourly rounding, and plan of care. Explained admission packet and patient handbook with patient bill of rights brochure. Will continue to monitor and document as needed.     Inpatient Nursing criteria listed below was met:    Health care directives status obtained and documented: No    Care Everywhere authorization obtained No    MRSA swab completed for patient 65 years and older: Yes    Patient identifies a surrogate decision maker: No    Core Measure diagnosis present:No.      If initial lactic acid >2.0, repeat lactic acid drawn within one hour of arrival to unit: No. If no, state reason:     Vaccination assessment and education completed: Yes   Vaccinations received prior to admission: Pneumovax no  Influenza(seasonal)  NO   Vaccination(s) ordered: patient declines    Clergy visit ordered if patient requests: Yes    Skin issues/needs documented: Yes    Isolation Patient: no Education given, correct sign in place and documentation row added to PCS:  No    Fall Prevention Yes: Care plan updated, education given and documented, sticker and magnet in place: Yes    Care Plan initiated: Yes    Education Documented (including assessment): Yes    Patient has discharge needs : Yes If yes, please explain: Will need new placement after discharging

## 2018-12-19 NOTE — PLAN OF CARE
Patient remains pretty lethargic and weak. Speaks very softly. Disoriented to place and time. Not able to void. Bladder scanned this afternoon and the highest amount was 278. Patient refused to have marie placed. Tried again with 1600 assessment and still not able to void. No distention noted. Repositioned q  2 hours. Has a pressure sore to coccyx, about 5 open areas. Foam dressing applied. Wound care RN seen today. IV patent and infusing without difficulty. IV rate increased to 125 hour. Fluids changed also. Drank a container of the ensure juice. Will feed dinner. Used call light to get up into chair. Up in chair. Will continue to monitor.

## 2018-12-19 NOTE — PLAN OF CARE
Patient very weak. Has to whisper when speaking. States he wants to go home. Denies any pain at present. Respirations easy and unlabored. Offers no complaints. No void yet

## 2018-12-19 NOTE — PLAN OF CARE
Face to face report given with opportunity to observe patient.    Report given to WERNER Cleary   12/18/2018  10:47 PM

## 2018-12-19 NOTE — PLAN OF CARE
Patient remains alert, denies pain, states he is comfortable.  Still no urge to void after being bladder scanned for only 131 ml's a couple hours ago. Has had 2 small stools.  Able to swallow water without issues.  Denies being hungry yet, did take some ensure last night.  AM labs still to be drawn, will let providers review them before pushing for more fluids.

## 2018-12-19 NOTE — PHARMACY-VANCOMYCIN DOSING SERVICE
Pharmacy Vancomycin Initial Note  Date of Service 2018  Patient's  1928  89 year old, male    Indication: Skin and Soft Tissue Infection    Current estimated CrCl = Estimated Creatinine Clearance: 18.3 mL/min (A) (based on SCr of 1.99 mg/dL (H)).    Creatinine for last 3 days  2018:  5:53 PM Creatinine 1.99 mg/dL    Recent Vancomycin Level(s) for last 3 days  No results found for requested labs within last 72 hours.      Vancomycin IV Administrations (past 72 hours)      No vancomycin orders with administrations in past 72 hours.                Nephrotoxins and other renal medications (From now, onward)    Start     Dose/Rate Route Frequency Ordered Stop    18  vancomycin (VANCOCIN) 750 mg in sodium chloride 0.9 % 250 mL intermittent infusion     Comments:  Pharmacy to dose    750 mg  188.7 mL/hr over 90 Minutes Intravenous EVERY 48 HOURS 18 180            Contrast Orders - past 72 hours (72h ago, onward)    None                Plan:  1.  Start vancomycin  750 mg IV q48h.   2.  Goal Trough Level: 10-15 mg/L   3.  Pharmacy will check trough levels as appropriate in 1-3 Days.    4. Serum creatinine levels will be ordered daily for the first week of therapy and at least twice weekly for subsequent weeks.    5. [place] method utilized to dose vancomycin therapy: [facility].    Harsh José, Pharm D.

## 2018-12-19 NOTE — PROGRESS NOTES
"CLINICAL NUTRITION SERVICES  -  ASSESSMENT NOTE    Danny KELLEY ConnorsIngalls : Admission Nutrition Risk Screen - healing wounds and weight loss    89 yom admitted for dehydration. Pt has a hx of CKD and HTN. Noted significant weight loss per medical chart review over the past 6 months and likely past year. Pt has had a poor appetite for a few months. Noted pt had 2 Ensure last night. Not much intake today. Noted healing wound on coccyx.    Diet Order: Low fat/ low salt  Intake: 2 Ensure yesterday and only a few bites today    Height: 5' 4\"  Weight: 116 lbs 13.5 oz  Body mass index is 20.06 kg/m .  Weight Status:  Normal BMI  IBW: 108-144lb  Weight History: Weigh hx per care everywhere. 28lb (19%) weight loss x 2 months. 52lb (31%) weight loss x 6 months.  143lb - 10/17/18  142lb - 10/3/18  167lb - 6/6/18  171lb - 9/13/17  173lb - 6/26/17    Estimated Energy Needs: 5846-7155 kcals (30-35 Kcal/Kg)   Estimated Protein Needs: 55-65 grams protein (1-1.2 g pro/Kg) Malnutrition and healing wound with CKD    MALNUTRITION:  % Weight Loss: > 10% in 6 months (severe malnutrition)  % Intake:  </= 75% for >/= 1 month (severe malnutrition)    Malnutrition Diagnosis: Severe malnutrition  In Context of: Environmental or social circumstances    NUTRITION DIAGNOSIS:  Malnutrition related to poor appetite/ oral intake as evidenced by 31% weight loss x 6 months.    NUTRITION RECOMMENDATIONS  - Suggest liberalizing diet to limit restrictions with poor intake (regular diet).  - Ensure Enlive TID and continue with Ensure or Boost Plus after discharge until intake is adequate  - Encourage high calorie foods  - Pt may benefit from 500mg Vitamin C BID and 220mg Zinc daily for wound healing      MONITORING AND EVALUATION:  RD will monitor intake, weight, labs      "

## 2018-12-19 NOTE — CONSULTS
Evaluated skin to coccyx per consult request by Dr. Bustillo. Danny requires assistance to reposition. WERNER Flor present during evaluation.  Sacral foam dressing intact, small amt of stool around rectum; did not migrate beneath dressing.  Wound bed and periwound skin cleansed and evaluated.  There are 5 pressure injuries to the sacrum/coccyx within an 8.0x2.8 area. The proximal most are Unstageable and covered with slough. The remaining have pink bases and are Stage 2.  Recommend at this time the following every other day dressing change/skin care:    Cleanse sacrum/coccyx with baby wash and water, pat or air dry. Apply Optifoam sacral dressing. If stool migrates beneath discontinue and use Aloe Vesta BID and PRN  Reposition every 2 hours from side to side.

## 2018-12-19 NOTE — PROVIDER NOTIFICATION
DATE:  12/19/2018   TIME OF RECEIPT FROM LAB:  0615  LAB TEST:  Lactic Acid and NA  LAB VALUE:  L.A. 2.7 and   RESULTS GIVEN WITH READ-BACK TO (PROVIDER):  Jacki Deal  TIME LAB VALUE REPORTED TO PROVIDER:   0624

## 2018-12-19 NOTE — PHARMACY
[facility]    Pharmacy    Antimicrobial Stewardship Note     Current antimicrobial therapy:  Anti-infectives (From now, onward)    Start     Dose/Rate Route Frequency Ordered Stop    12/18/18 1930  vancomycin (VANCOCIN) 750 mg in sodium chloride 0.9 % 250 mL intermittent infusion     Comments:  Pharmacy to dose    750 mg  188.7 mL/hr over 90 Minutes Intravenous EVERY 48 HOURS 12/18/18 1809 12/18/18 1815  cefTRIAXone in d5w (ROCEPHIN) intermittent infusion 1 g      1 g  over 30 Minutes Intravenous EVERY 24 HOURS 12/18/18 1809          Indication: UTI (ceftriaxone), SSTI (vancomycin)    Days of Therapy: 2     Pertinent labs:  Creatinine   Creatinine   Date Value Ref [facility] Status   12/19/2018 2.11 (H) 0.66 - 1.25 mg/dL Final   12/18/2018 1.99 (H) 0.66 - 1.25 mg/dL Final   10/30/2018 1.20 0.66 - 1.25 mg/dL Final     WBC   WBC   Date Value Ref [facility] Status   12/19/2018 22.4 (H) 4.0 - 11.0 10e9/L Final   10/30/2018 11.4 (H) 4.0 - 11.0 10e9/L Final   09/25/2018 6.8 4.0 - 11.0 10e9/L Final     Procalcitonin No results found for: PCAL  CRP   CRP Inflammation   Date Value Ref [facility] Status   09/25/2018 6.5 0.0 - 8.0 mg/L Final   09/18/2018 11.3 (H) 0.0 - 8.0 mg/L Final     Culture Results:   7-Day Micro Results   Procedure Component Value Units Date/Time   Wound Culture Aerobic Bacterial [[id number]] (Abnormal) Collected: 12/18/18 1800   Order Status: Completed Lab Status: Preliminary result Updated: 12/19/18 0849   Specimen: Coccyx     Specimen Description Coccyx    Culture Micro Heavy growth   Non lactose fermenting gram negative rods   Identification and susceptibility to follow.   Abnormal    Blood culture [[id number]] Collected: 12/18/18 1752   Order Status: Completed Lab Status: Preliminary result Updated: 12/19/18 0730   Specimen: Blood     Specimen Description Blood    Special Requests Right Arm    Culture Micro No growth after 13 hours   Blood culture [[id number]] Collected: 12/18/18 1752   Order Status:  Completed Lab Status: Preliminary result Updated: 12/19/18 0730   Specimen: Blood     Specimen Description Blood    Special Requests Right Hand    Culture Micro No growth after 13 hours   Active MRSA Surveillance Culture [[id number]] Collected: 12/18/18 1730   Order Status: Completed Lab Status: Preliminary result Updated: 12/19/18 0822   Specimen: Nares from Nose     Specimen Description Nares    Culture Micro Culture in progress     Recommendations/Interventions:  1. Wound culture from coccyx showing heavy growth non-lactose fermenting gram negative rods. Consider discontinuing vancomycin and adding IV ciprofloxacin 200-400 mg q18-24h OR meropenem 500 mg q12h (both dosing recommendations include renal dose adjustments) to cover for possible Pseudomonas infection.      Elaina Perdomo RPH  December 19, 2018

## 2018-12-19 NOTE — PLAN OF CARE
[place] [facility] [place]  Transferred to bed via sideboard d/t weakness.  Alert and oriented on admission, thought it was January but oriented otherwise.  Voice is very hoarse and almost like a whisper, very hard to hear him speak.  Later in the night he was more confused; asking where he was and how he got here.  Justin has a large open pressure area; wound culture sent, mepilex dressing applied, and WOC consult ordered.  Bilateral feet have some intact scabbing.  Pedal pulses positive.  Cool, dry, flaky skin.  Unkept man.  Oral cares provided.  Oral mucosa dry.  Brushed upper and lower dentures and soaking overnight.  He did have 2 ensures tonight; I spaced them apart a couple hours because I thought he would become nauseated with too much intake.  He was able to drink fluids almost independently, minimal assist from nursing staff.  No coughing or aspiration issues with drinking tonight.  LS are very diminished in the right lower lobe, but no crackles heard throughout.  Apical is irregular and distant.  Ordered PT, OT, WOC, social work, and spiritual consults.  IV placed in AC, fluids and IV ABX given as ordered.  Bed is a zoneair to help with pressure areas, bed alarm on, and room is by nursing station for safety

## 2018-12-19 NOTE — PLAN OF CARE
Patient still without urine output since arrival.  Just bladder scanned, highest number we could get was 131 ml's.  Systolic pressure in the 150's.  Patient still seems alert and oriented.  Heart rate 70's.  Given patients age, history of chronic kidney disease, and diagnosis of dehydration I will continue to monitor and inform MD in the morning.  IV fluid remains at 50/hr, lungs remain clear.

## 2018-12-19 NOTE — PLAN OF CARE
Face to face report given with opportunity to observe patient.    Report given to Basia Saez   12/19/2018  6:56 AM

## 2018-12-19 NOTE — PROGRESS NOTES
Spoke Jonathan RN with [facility], she was able to provide assessment information.  Spoke with Denise Bryant, [place] .  She advised that the plan was for transition to [facility] for long term care.  Morales has been a resident of [facility] since November 29th prior to that he was a resident at [facility] starting August 26.      Spoke with Morales's nephew, Jhon.  Provided updated on plan of care and reviewed Medicare letter.  Jhon expressed that he was extremely grateful that Morales was in the hospital and believes that he is in good hands.  He explained that while at the clinic appointment yesterday Morales couldn't sit up, he was very weak and Jhon actually believed that he would die in the doctor's office.  He also explained that Morales really has not been eating much within the last month or so as he has not been able to taste (this was also something Denise Bryant mentioned).  Jhon and his wife, Bridget are Morales's only living family.  Jhon denied additional questions or concerns.        Assessment completed see flowsheet.      Dx: dehydration, weakness      Lives with:  residents    Living at:  [facility]    Equipment used: walker, wheel chair (per Jonathan this was someone else's wheelchair)    Support System: Description of Support System: Supportive, Involved        Primary PCP: Erick Etsevez    POA/Guardian: Yes financial Bridget and Jhon [facility] Directive: YES per conversation with Jhon he thought Morales had one; if they have a copy this will be faxed to writer     Pharmacy: [facility]     :  Denise Bryant; contacted     Homecare/[facility] Services:   MA       Adequate Resources for needs (housing, utilities, food/med): YES    Meds and appointments management: YES    Work: NO    Transportation: YES - agency       ADLs: continence, transferring, dressing, toileting, eating and bathing    Falls: No    Able to Return to Prior Living Arrangements: NO-  transition to long term care         Goals: transition to long term care, remain comfortable     Barriers: no barriers identified     Needs: Demonstrates Competency    YESSI: elevated     Discharge Plan: long term care via agency.  Not officially accepted at [facility].

## 2018-12-20 NOTE — PROVIDER NOTIFICATION
CRITICAL LAB VALUE    DATE:  12/20/2018     TIME OF RECEIPT FROM LAB:  0715    LAB TEST:  Sodium    LAB VALUE:  151    RESULTS GIVEN WITH READ-BACK TO (PROVIDER):  Liss YOUSSEF NP    TIME LAB VALUE REPORTED TO PROVIDER:   0720

## 2018-12-20 NOTE — PROVIDER NOTIFICATION
[id number]:Pt up in chair PT in to se pt. Sats noted to be low 80's on room air. RR 24+ with shallow respirations. Crackles to R lower base noted. O2 had to be increased to 6 lpm for approx 10 minutes but them titrated off. Pt hoyered back to chair. Pt's respirations eased, sats 92% on room air. Dr De Guzman notified. New orders pending. 1400: Sats drifting to upper 80's swallow eval completed. Pt placed on 2 lpm, sats increased to 945 on two lpm.

## 2018-12-20 NOTE — PLAN OF CARE
[facility] Full Skin Assessment    Full skin inspection was completed by Pauline Page RN on 12/20/2018, 10:31 AM. There is a large pressure ulcer noted to the patients coccyx, barrier cream applied.

## 2018-12-20 NOTE — PLAN OF CARE
VSS on room air. Turn and repositioned [id number] due to pressure injury on coccyx. Pt had incontinent BM, mepilex foam to removed and barrier cream applied. Lung sounds diminished in the bases otherwise clear. Denies pain. Gamble catheter placed due to urinary retention. Pt confused throughout the night.  IV fluids continue.     Face to face report given with opportunity to observe patient.    Report given to Bryleigh, RN Mara J. Wallis   12/20/2018  7:10 AM

## 2018-12-20 NOTE — PROGRESS NOTES
12/20/18 1453   General Information   Onset Date 12/20/18   Start of Care Date 12/20/18   Referring Physician Dr. De Guzman    Patient/Family Goals Statement Patient unable to verbalize a goal.    Swallowing Evaluation Bedside swallow evaluation   Behaviorial Observations Lethargic   Mode of current nutrition Oral diet   Type of oral diet Regular;Thin liquid   Respiratory Status O2 Supply   Type of O2 supply Nasal cannula   Clinical Swallow Evaluation   Oral Musculature anomalies present   Structural Abnormalities none present   Dentition edentulous, does not have dentures   Secretion Management problems swallowing secretions   Mucosal Quality dry   Mandibular Strength and Mobility impaired   Oral Labial Strength and Mobility impaired retraction;impaired seal;impaired pursing;impaired coordination   Lingual Strength and Mobility impaired protrusion;impaired anterior elevation;impaired left lateral movement;impaired right lateral movement;impaired coordination   Velar Elevation impaired   Buccal Strength and Mobility impaired   Additional Documentation Yes   Swallow Eval   Feeding Assistance dependent   Clinical Swallow Eval: Nectar Thick Liquid Texture Trial   Mode of Presentation, Nectar spoon   Volume of Nectar Presented 2 oz   Oral Phase, [place] WFL   Pharyngeal Phase, Nectar intact   Clinical Swallow Eval: Puree Solid Texture Trial   Mode of Presentation, Puree spoon   Volume of Puree Presented 2 oz   Oral Phase, Puree WFL   Pharyngeal Phase, Puree intact   Swallow Compensations   Swallow Compensations Pacing;Reduce amounts   Results No difficulties noted   General Therapy Interventions   Planned Therapy Interventions Dysphagia Treatment   Dysphagia treatment Oropharyngeal exercise training;Modified diet education;Instruction of safe swallow strategies;Compensatory strategies for swallowing   Swallow Eval: Clinical Impressions   Skilled Criteria for Therapy Intervention Skilled criteria met.  Treatment  indicated.   Dysphagia Outcome Severity Scale (KIMBERLY) Level 5 - KIMBERLY   Treatment Diagnosis Oral Pharyngeal Dysphagia    Diet texture recommendations Nectar thick liquids;Dysphagia diet level 1   Recommended Feeding/Eating Techniques alternate between small bites and sips of food/liquid;check mouth frequently for oral residue/pocketing;hard swallow w/ each bite or sip;maintain upright posture during/after eating for 30 mins;no straws;small sips/bites   Therapy Frequency 3 times/wk   Predicted Duration of Therapy Intervention (days/wks) 1 week   Anticipated Discharge Disposition extended care facility   Risks and Benefits of Treatment have been explained. Yes   Patient, family and/or staff in agreement with Plan of Care Yes   Total Evaluation Time   Total Evaluation Time (Minutes) 15

## 2018-12-20 NOTE — PLAN OF CARE
Pt's O2 titrated up to 8 lpm via simple mask sats 92-93% RR 20-22. Crackles noted in Right base. Portable CXR obtained. Pt also inc of stool and large amount of hard stool noted in rectal vault. Digital removal of hard stool performed. Pt now has been inc of large amount of liquid stool.  Pt more lethargic, but able to answer yes/no questions. Pt's nephew Jhon updated on condition.

## 2018-12-20 NOTE — PLAN OF CARE
Per lab there was a critical sodium level of 151. patients primary nurse and provider was updated.

## 2018-12-20 NOTE — PROGRESS NOTES
[facility]    Hospitalist Progress Note      Assessment & Plan   Danny Muniz is a 89 year old male who was admitted on 12/18/2018.      1.  Acute renal failure: Patient quite dry.  Believe this is the primary etiology is poor oral intake.  Was getting minimal fluid initially.  Bumped him up to 125 an hour yesterday afternoon.  Urine output is starting to .  They did require Gamble catheter placement due to urinary retention.  BUN/creatinine have not markedly improved at all.  Hemodynamically stable.  Will bump up his IV fluids for a while during the day here.  Have to be a little bit careful given his 89 years of age and having issues with volume overload.  Bicarb is down potassium is stable.  No real signs of volume overload at this point yet.  Is really not increased much at all.    2.  Urinary tract infection: Still awaiting culture results.  Is being covered with Rocephin.  White count is down.  No fevers.    3.  Sacral pressure ulcers: Do not feel that these are really infected.  He is getting wound care daily to them.  And appropriate positioning.    4.  Cardiac status: Previous echo in June showed normal EF 55-60%.  At this time seems to be tolerating the fluids well.  No signs of volume overload.  Feel that we can adequately fluid resuscitate him.    5.  Significant protein malnutrition.  Just due to poor oral intake.  Will continue to push the supplements as he tolerates.    6.  Disposition once dehydration and metabolic abnormalities resolved will need nursing home placement.    Diet: Regular Diet Adult  Fluids: Half-normal saline 150 cc an hour    # Pain Assessment:  Current Pain Score 12/19/2018   Patient currently in pain? sleeping: patient not able to self report   Pain score (0-10) -   Danny roca pain level was assessed and he currently denies pain.        DVT Prophylaxis: Heparin SQ  Code Status: DNR/DNI    Disposition: Expected discharge in 4-6 days once metabolic abnormalities  resolved.    Reese De Guzman    Interval History   Patient's morning still remains rather lethargic.  Does arouse to voice and stimulation.  Still speaks in very whispery voice.  Denies any pain.  No shortness of breath.  Not hungry at all.  All signs remained stable.  His urinary retention forced us to Place Gamble catheter.  Urine output is picking up.  Renal labs still are quite poor.    -Data reviewed today: I reviewed all new labs and imaging results over the last 24 hours. I personally reviewed no images or EKG's today.    Physical Exam   Temp: 96.7  F (35.9  C) Temp src: Temporal BP: 117/68 Pulse: 83 Heart Rate: 81 Resp: 18 SpO2: 92 % O2 Device: None (Room air)    Vitals:    12/18/18 1907 12/19/18 0705   Weight: 52.3 kg (115 lb 4.8 oz) 53 kg (116 lb 13.5 oz)     Vital Signs with Ranges  Temp:  [96.7  F (35.9  C)-98.2  F (36.8  C)] 96.7  F (35.9  C)  Pulse:  [83] 83  Heart Rate:  [81-85] 81  Resp:  [18-20] 18  BP: (106-143)/(64-79) 117/68  SpO2:  [92 %] 92 %  I/O last 3 completed shifts:  In: 1082 [P.O.:357; I.V.:725]  Out: 400 [Urine:400]    Peripheral IV 12/18/18 Left (Active)   Site Assessment WDL 12/20/2018  4:00 AM   Line Status Infusing;Checked every 1-2 hour 12/20/2018  4:00 AM   Phlebitis Scale 0-->no symptoms 12/20/2018  4:00 AM   Infiltration Scale 0 12/20/2018  4:00 AM   Number of days: 2       Urethral Catheter Latex (Active)   Collection Container Standard 12/20/2018  4:00 AM   Securement Method Securing device (Describe) 12/20/2018  4:00 AM   Rationale for Continued Use Retention 12/20/2018  4:00 AM   Urine Output 275 mL 12/20/2018  7:18 AM   Number of days: 1       Pressure Injury 12/18/18 Posterior Coccyx large open areas to coccyx area (Active)   Wound Base Yellow;Pink;Moist 12/19/2018  8:23 PM   Nita-wound Assessment Denuded;Blanchable erythema 12/18/2018  5:40 PM   Drainage Amount Small 12/19/2018  8:23 PM   Drainage Color/Characteristics Yellow;Serosanguinous 12/19/2018  8:23 PM   Dressing  Foam 12/19/2018  8:23 PM   Dressing Status Clean, dry, intact 12/19/2018  8:23 PM   Number of days: 2     No line/device    Constitutional: She is somewhat lethargic but does arouse to voice.  No obvious distress     HEENT: Normocephalic/atraumatic, PERRL, EOMI, mouth still very dry, neck supple, no LN.     Cardiovascular: RRR.  No murmur, no  rubs, or gallops.  JVD flat.  Bruits no.  Pulses 2+    Respiratory: Clear to auscultation bilaterally    Abdomen: Soft, nontender, nondistended, no organomegaly. Bowel sounds present    Extremities: Warm/dry.  No edema    Neuro:   Non focal, cranial nerves intact, Moves all extremities.  Medications     NaCl 125 mL/hr at 12/20/18 0030       camphor-eucalyptus-menthol   Topical BID     cefTRIAXone  1 g Intravenous Q24H     heparin (porcine)  5,000 Units Subcutaneous Q12H     terazosin  5 mg Oral At Bedtime       Data   Recent Labs   Lab 12/20/18  0516 12/19/18  0542 12/18/18  1753   WBC 15.7* 22.4*  --    HGB 13.2* 15.1  --    MCV 95 94  --     270 306   * 155*  --    POTASSIUM 3.5 4.3  --    CHLORIDE 119* 116*  --    CO2 18* 26  --    * 125*  --    CR 2.19* 2.11* 1.99*   ANIONGAP 14 13  --    ARNOLD 8.2* 8.6  --    * 162*  --    ALBUMIN  --  2.6*  --    PROTTOTAL  --  6.8  --    BILITOTAL  --  0.7  --    ALKPHOS  --  98  --    ALT  --  251*  --    AST  --  429*  --        No results found for this or any previous visit (from the past 24 hour(s)).

## 2018-12-20 NOTE — PROGRESS NOTES
Patient this afternoon had an episode of acute hypoxia.  Required 48 L of oxygen.  Then basically resolved he was back to room air.  [place] to him he does have a flat more crackles on the right side.  And did progressively get a little more congested requiring 8 L once again simple mask and keep sats greater than 90%.  I did perform a chest x-ray right-sided atelectasis/infiltrate.  Suspect he may have aspirated.  Does not appear to be grossly volume overloaded all.  Still needs the free water for his significant dehydration.  I did cut back his rate a little bit 200.  Did have significant stooling today.  His nephew has been updated.  Our goals are to try and do simple things with Mr. Muniz.  If he decompensates further respiratory standpoint would not move him to the unit for BiPAP etc.

## 2018-12-20 NOTE — PLAN OF CARE
Pt not able to tolerate PT this AM. With sitting up in recliner pt hyperventilating and SpO2 dropped. Assisted nurse with peyton lift to get pt to bed. Nursing took over with pt. Will attempt again when able.

## 2018-12-20 NOTE — PROGRESS NOTES
Received call back from Jhon yao.  He advised that they do not have a formal health care directive completed.

## 2018-12-20 NOTE — PLAN OF CARE
Discharge Planner SLP   Patient plan for discharge: Extended care facility   Current status: nectar liquids with pureed textures   Barriers to return to prior living situation: None known  Recommendations for discharge: Above diet  Rationale for recommendations: Weakness, lethargy        Entered by: Mara Reyes 12/20/2018 2:59 PM

## 2018-12-20 NOTE — PROVIDER NOTIFICATION
Pt has not voided this shift. Bladder scanned for 396. Notified MD, order received for marie catheter. Marie catheter placed for urinary retention with 400cc of tea colored urine.

## 2018-12-20 NOTE — PLAN OF CARE
Face to face report given with opportunity to observe patient.  Report given to Narda LEO RN, RN.    Basia Swanson  12/19/2018, 6:55 PM

## 2018-12-20 NOTE — PLAN OF CARE
OT attempted to see pt this afternoon for eval. Pt was in bed on RA with O2 in the low 90s. Pt spoke very softly and had difficulties conversing with OTR. He was unable to explain much of his PLOF. BUE AROM was functional, limited with shoulders. Provided pt with a wash cloth and he was unable to grasp but attempted to bring it to his face. Pt's O2 sats dropped to 86, but recovered within a minute to low 90s. OT eval was ceased at this time; pt unable to tolerate much activity. Will attempt again when able.

## 2018-12-21 NOTE — PLAN OF CARE
Spoke to Alba Coulter. Informed of patient's passing. Alba's wheelchair and pillows here. Per [facility] staff member, she will notify  in the morning to arrange .

## 2018-12-21 NOTE — PLAN OF CARE
Face to face report given with opportunity to observe patient.    Report given to Anni S. RN Bryleigh O. Starich   12/20/2018  7:20 PM

## 2018-12-21 NOTE — PLAN OF CARE
Patient  at . Cease of heart beat verified by 2 RNs. Niece Bridget and niece's  Jhon at bedside. Dr. Bustillo notified at . Jhon called [facility] immediately to notify of patient's passing. Family does not want any of the patient's belongings, states they can be disposed of or donated.

## 2018-12-21 NOTE — PLAN OF CARE
Patient's niece Bridget and nephew-in-law Jhon here to see patient. Updated on patient's continued decline and poor prognosis. States they will remain at patient's bedside for a while. Asked if they would like hospital  to visit, family declined. Will remain available for support.

## 2018-12-24 LAB
BACTERIA SPEC CULT: NORMAL
BACTERIA SPEC CULT: NORMAL
Lab: NORMAL
Lab: NORMAL
SPECIMEN SOURCE: NORMAL
SPECIMEN SOURCE: NORMAL

## 2018-12-26 LAB
BACTERIA SPEC CULT: ABNORMAL
BACTERIA SPEC CULT: ABNORMAL
SPECIMEN SOURCE: ABNORMAL

## 2019-01-14 NOTE — DISCHARGE SUMMARY
Admit Date:     12/18/2018   Discharge Date:     12/20/2018      DISCHARGE DIAGNOSES:   1.   acute renal failure secondary to dehydration.   2.  Profound dehydration.   3.  Hypernatremia.   4.  Severe malnutrition.   5.  Urinary tract infection with acute kidney injury.   6.  Abnormal LFTs.   7.  Probable aspiration pneumonia.      PROCEDURES:  None.      COMPLICATIONS:  None.       HOSPITAL COURSE:  Mr. Muniz is a 89-year-old gentleman who presented to the hospital after being seen by his primary care provider in the clinic, Dr. Erick Estevez.  The patient has had progressive decline over the last several months.  Over the last couple of months, his weight has gone down over 25+ pounds.  He had poor p.o. intake, taste was worse.  He was becoming much more week.  Apparently he was brought into the clinic that day, kind of slumped over in a wheelchair, was awake but very soft whispering voice.  Denied any significant pain.  Blood pressure was stable.  Heart rate was stable.  He had no fever.  Lab work performed showed several issues, in that he was fairly profoundly hypernatremic with a sodium up in 156 range.  BUN and creatinine were markedly elevated at 105 and 2.08.  Normal BUN, creatinine back in September 2018 was 28 and 1.0.  He had no fever at all.  Oxygen saturations were stable.  His urine did have some evidence of pyuria.  Eventually grew out some proteus.  Did have a sacral and contiguous ulceration pressure sore.  He was given IV fluids.  We tried to feed him, but he just had minimal oral intake.  With IV fluids, his BUN and creatinine actually did not improve much at all.  He had poor urine output.  We placed a Gamble catheter.  He had some residual.  His BUN and creatinine actually had continued to increase up to 120 and 2.19.  His serum sodium was slowly decreasing to 151.  White count was 22,000 and went down to 15,000.  Hemoglobin remained stable.  He was kept on IV antibiotics, some Rocephin,  empirically treat UTI.  So unfortunately what subsequently occurred, he became extremely more weaker on the day of his death.  His oxygenation started to decrease.  I did do a chest x-ray which showed right-sided infiltrate with some concerns that he probably aspirated due to his weakness.  He was DNR/DNI status.  Our goal was to make him as comfortable as we could.  Any further interventions were not to be undertaken.  Unfortunately, over that evening, at about 2110 on , the patient did pass away.  His nephew had been notified and was present during that time.      Multiple issues were ongoing with Mr. Muniz.  His age of 89 years old, he just has been declining significantly over the last couple of months with just no significant oral intake, becoming very volume depleted, hypernatremic and did have a UTI and with IV hydration, he did not seem to improve much at all and just had worsening renal function and respiratory status and subsequently started to decline also with IV fluids and also with a possible aspiration accommodating his ultimate death.        CAUSE OF DEATH:  Likely is acutely was the probable aspiration pneumonia associated with severe malnutrition and acute renal failure due to dehydration.  No post was obtained.      The patient did not have any evidence of sepsis.         AMYRA SANCHEZ MD             D: 2019   T: 2019   MT: VICTORINA      Name:     DIMPLE MUNIZ   MRN:      [id number]        Account:        [id number]   :      [id number]           Admit Date:     2018                                  Discharge Date: 2018      Document: [id number]       cc: Erick Estevez MD